# Patient Record
Sex: FEMALE | Race: WHITE | NOT HISPANIC OR LATINO | Employment: OTHER | URBAN - METROPOLITAN AREA
[De-identification: names, ages, dates, MRNs, and addresses within clinical notes are randomized per-mention and may not be internally consistent; named-entity substitution may affect disease eponyms.]

---

## 2017-05-04 ENCOUNTER — HOSPITAL ENCOUNTER (OUTPATIENT)
Dept: GENERAL RADIOLOGY | Facility: HOSPITAL | Age: 77
Discharge: HOME | End: 2017-05-04
Attending: INTERNAL MEDICINE | Admitting: INTERNAL MEDICINE

## 2017-05-04 ENCOUNTER — OFFICE VISIT (OUTPATIENT)
Dept: CARDIOLOGY | Facility: CLINIC | Age: 77
End: 2017-05-04

## 2017-05-04 VITALS
DIASTOLIC BLOOD PRESSURE: 78 MMHG | HEIGHT: 62 IN | BODY MASS INDEX: 31.1 KG/M2 | SYSTOLIC BLOOD PRESSURE: 124 MMHG | HEART RATE: 56 BPM | WEIGHT: 169 LBS

## 2017-05-04 DIAGNOSIS — R05.9 COUGH: ICD-10-CM

## 2017-05-04 DIAGNOSIS — R06.02 SHORTNESS OF BREATH: ICD-10-CM

## 2017-05-04 DIAGNOSIS — R06.2 WHEEZING: ICD-10-CM

## 2017-05-04 DIAGNOSIS — I25.10 CORONARY ARTERY DISEASE INVOLVING NATIVE CORONARY ARTERY OF NATIVE HEART WITHOUT ANGINA PECTORIS: ICD-10-CM

## 2017-05-04 DIAGNOSIS — E11.59 TYPE 2 DIABETES MELLITUS WITH OTHER CIRCULATORY COMPLICATION: ICD-10-CM

## 2017-05-04 DIAGNOSIS — R05.9 COUGH: Primary | ICD-10-CM

## 2017-05-04 PROCEDURE — 99214 OFFICE O/P EST MOD 30 MIN: CPT | Performed by: INTERNAL MEDICINE

## 2017-05-04 PROCEDURE — 71020 HC CHEST PA AND LATERAL: CPT

## 2017-05-04 RX ORDER — ROSUVASTATIN CALCIUM 20 MG/1
5 TABLET, COATED ORAL DAILY
COMMUNITY
Start: 2016-05-31

## 2017-05-04 RX ORDER — METHYLPREDNISOLONE 4 MG/1
TABLET ORAL
Qty: 21 TABLET | Refills: 0 | Status: SHIPPED | OUTPATIENT
Start: 2017-05-04 | End: 2017-11-10 | Stop reason: HOSPADM

## 2017-05-04 RX ORDER — ALBUTEROL SULFATE 90 UG/1
2 AEROSOL, METERED RESPIRATORY (INHALATION) EVERY 4 HOURS PRN
Refills: 0 | COMMUNITY
Start: 2017-03-01

## 2017-05-04 RX ORDER — ZOLPIDEM TARTRATE 10 MG/1
10 TABLET ORAL DAILY
COMMUNITY
Start: 2017-04-21

## 2017-11-07 ENCOUNTER — HOSPITAL ENCOUNTER (INPATIENT)
Facility: HOSPITAL | Age: 77
LOS: 3 days | Discharge: HOME | End: 2017-11-10
Attending: EMERGENCY MEDICINE | Admitting: FAMILY MEDICINE

## 2017-11-07 ENCOUNTER — APPOINTMENT (OUTPATIENT)
Dept: GENERAL RADIOLOGY | Facility: HOSPITAL | Age: 77
End: 2017-11-07

## 2017-11-07 DIAGNOSIS — J18.9 PNEUMONIA OF RIGHT LOWER LOBE DUE TO INFECTIOUS ORGANISM: Primary | ICD-10-CM

## 2017-11-07 DIAGNOSIS — R09.02 HYPOXIA: ICD-10-CM

## 2017-11-07 LAB
ALBUMIN SERPL-MCNC: 3.8 G/DL (ref 3.5–5.2)
ALBUMIN/GLOB SERPL: 1.4 G/DL
ALP SERPL-CCNC: 68 U/L (ref 40–129)
ALT SERPL W P-5'-P-CCNC: 20 U/L (ref 5–33)
ANION GAP SERPL CALCULATED.3IONS-SCNC: 13.1 MMOL/L
AST SERPL-CCNC: 18 U/L (ref 5–32)
B PERT DNA SPEC QL NAA+PROBE: NOT DETECTED
BASOPHILS # BLD AUTO: 0.02 10*3/MM3 (ref 0–0.2)
BASOPHILS NFR BLD AUTO: 0.5 % (ref 0–2)
BILIRUB SERPL-MCNC: 0.3 MG/DL (ref 0.2–1.2)
BUN BLD-MCNC: 9 MG/DL (ref 8–23)
BUN/CREAT SERPL: 12 (ref 7–25)
C PNEUM DNA NPH QL NAA+NON-PROBE: NOT DETECTED
CALCIUM SPEC-SCNC: 8.6 MG/DL (ref 8.8–10.5)
CHLORIDE SERPL-SCNC: 102 MMOL/L (ref 98–107)
CO2 SERPL-SCNC: 25.9 MMOL/L (ref 22–29)
CREAT BLD-MCNC: 0.75 MG/DL (ref 0.57–1)
D-LACTATE SERPL-SCNC: 2.3 MMOL/L (ref 0.5–2)
D-LACTATE SERPL-SCNC: 2.5 MMOL/L (ref 0.5–2)
DEPRECATED RDW RBC AUTO: 43.9 FL (ref 37–54)
EOSINOPHIL # BLD AUTO: 0.12 10*3/MM3 (ref 0.1–0.3)
EOSINOPHIL NFR BLD AUTO: 3 % (ref 0–4)
ERYTHROCYTE [DISTWIDTH] IN BLOOD BY AUTOMATED COUNT: 12.5 % (ref 11.5–14.5)
FLUAV AG NPH QL: NEGATIVE
FLUAV H1 2009 PAND RNA NPH QL NAA+PROBE: NOT DETECTED
FLUAV H1 HA GENE NPH QL NAA+PROBE: NOT DETECTED
FLUAV H3 RNA NPH QL NAA+PROBE: NOT DETECTED
FLUAV SUBTYP SPEC NAA+PROBE: NOT DETECTED
FLUBV AG NPH QL IA: NEGATIVE
FLUBV RNA ISLT QL NAA+PROBE: NOT DETECTED
GFR SERPL CREATININE-BSD FRML MDRD: 75 ML/MIN/1.73
GLOBULIN UR ELPH-MCNC: 2.8 GM/DL
GLUCOSE BLD-MCNC: 140 MG/DL (ref 65–99)
HADV DNA SPEC NAA+PROBE: NOT DETECTED
HCOV 229E RNA SPEC QL NAA+PROBE: NOT DETECTED
HCOV HKU1 RNA SPEC QL NAA+PROBE: NOT DETECTED
HCOV NL63 RNA SPEC QL NAA+PROBE: NOT DETECTED
HCOV OC43 RNA SPEC QL NAA+PROBE: NOT DETECTED
HCT VFR BLD AUTO: 34.9 % (ref 37–47)
HGB BLD-MCNC: 11.7 G/DL (ref 12–16)
HMPV RNA NPH QL NAA+NON-PROBE: NOT DETECTED
HOLD SPECIMEN: NORMAL
HPIV1 RNA SPEC QL NAA+PROBE: NOT DETECTED
HPIV2 RNA SPEC QL NAA+PROBE: NOT DETECTED
HPIV3 RNA NPH QL NAA+PROBE: NOT DETECTED
HPIV4 P GENE NPH QL NAA+PROBE: DETECTED
IMM GRANULOCYTES # BLD: 0.01 10*3/MM3 (ref 0–0.03)
IMM GRANULOCYTES NFR BLD: 0.3 % (ref 0–0.5)
LYMPHOCYTES # BLD AUTO: 0.35 10*3/MM3 (ref 0.6–4.8)
LYMPHOCYTES NFR BLD AUTO: 8.8 % (ref 20–45)
M PNEUMO IGG SER IA-ACNC: NOT DETECTED
MCH RBC QN AUTO: 32.3 PG (ref 27–31)
MCHC RBC AUTO-ENTMCNC: 33.5 G/DL (ref 31–37)
MCV RBC AUTO: 96.4 FL (ref 81–99)
MONOCYTES # BLD AUTO: 0.6 10*3/MM3 (ref 0–1)
MONOCYTES NFR BLD AUTO: 15 % (ref 3–8)
NEUTROPHILS # BLD AUTO: 2.9 10*3/MM3 (ref 1.5–8.3)
NEUTROPHILS NFR BLD AUTO: 72.4 % (ref 45–70)
NRBC BLD MANUAL-RTO: 0 /100 WBC (ref 0–0)
NT-PROBNP SERPL-MCNC: 1446 PG/ML (ref 5–450)
PLATELET # BLD AUTO: 118 10*3/MM3 (ref 140–500)
PMV BLD AUTO: 11.2 FL (ref 7.4–10.4)
POTASSIUM BLD-SCNC: 3.6 MMOL/L (ref 3.5–5.2)
PROCALCITONIN SERPL-MCNC: 0.04 NG/ML (ref 0.1–0.25)
PROT SERPL-MCNC: 6.6 G/DL (ref 6–8.5)
RBC # BLD AUTO: 3.62 10*6/MM3 (ref 4.2–5.4)
RHINOVIRUS RNA SPEC NAA+PROBE: NOT DETECTED
RSV RNA NPH QL NAA+NON-PROBE: NOT DETECTED
SODIUM BLD-SCNC: 141 MMOL/L (ref 136–145)
TROPONIN T SERPL-MCNC: <0.01 NG/ML (ref 0–0.03)
TROPONIN T SERPL-MCNC: <0.01 NG/ML (ref 0–0.03)
WBC NRBC COR # BLD: 4 10*3/MM3 (ref 4.8–10.8)
WHOLE BLOOD HOLD SPECIMEN: NORMAL
WHOLE BLOOD HOLD SPECIMEN: NORMAL

## 2017-11-07 PROCEDURE — 25010000002 METHYLPREDNISOLONE PER 125 MG: Performed by: INTERNAL MEDICINE

## 2017-11-07 PROCEDURE — 25010000002 AZITHROMYCIN: Performed by: EMERGENCY MEDICINE

## 2017-11-07 PROCEDURE — 83880 ASSAY OF NATRIURETIC PEPTIDE: CPT | Performed by: EMERGENCY MEDICINE

## 2017-11-07 PROCEDURE — 94799 UNLISTED PULMONARY SVC/PX: CPT

## 2017-11-07 PROCEDURE — 87804 INFLUENZA ASSAY W/OPTIC: CPT | Performed by: EMERGENCY MEDICINE

## 2017-11-07 PROCEDURE — 84145 PROCALCITONIN (PCT): CPT | Performed by: EMERGENCY MEDICINE

## 2017-11-07 PROCEDURE — 83605 ASSAY OF LACTIC ACID: CPT | Performed by: EMERGENCY MEDICINE

## 2017-11-07 PROCEDURE — 87581 M.PNEUMON DNA AMP PROBE: CPT | Performed by: EMERGENCY MEDICINE

## 2017-11-07 PROCEDURE — 25010000002 CEFTRIAXONE: Performed by: EMERGENCY MEDICINE

## 2017-11-07 PROCEDURE — 84484 ASSAY OF TROPONIN QUANT: CPT | Performed by: EMERGENCY MEDICINE

## 2017-11-07 PROCEDURE — 87486 CHLMYD PNEUM DNA AMP PROBE: CPT | Performed by: EMERGENCY MEDICINE

## 2017-11-07 PROCEDURE — 99222 1ST HOSP IP/OBS MODERATE 55: CPT | Performed by: INTERNAL MEDICINE

## 2017-11-07 PROCEDURE — 93010 ELECTROCARDIOGRAM REPORT: CPT | Performed by: INTERNAL MEDICINE

## 2017-11-07 PROCEDURE — 25010000002 METHYLPREDNISOLONE PER 40 MG: Performed by: EMERGENCY MEDICINE

## 2017-11-07 PROCEDURE — 25010000002 ENOXAPARIN PER 10 MG: Performed by: INTERNAL MEDICINE

## 2017-11-07 PROCEDURE — 85025 COMPLETE CBC W/AUTO DIFF WBC: CPT | Performed by: EMERGENCY MEDICINE

## 2017-11-07 PROCEDURE — 71020 HC CHEST PA AND LATERAL: CPT

## 2017-11-07 PROCEDURE — 99291 CRITICAL CARE FIRST HOUR: CPT | Performed by: EMERGENCY MEDICINE

## 2017-11-07 PROCEDURE — 94640 AIRWAY INHALATION TREATMENT: CPT

## 2017-11-07 PROCEDURE — 87798 DETECT AGENT NOS DNA AMP: CPT | Performed by: EMERGENCY MEDICINE

## 2017-11-07 PROCEDURE — 84484 ASSAY OF TROPONIN QUANT: CPT | Performed by: INTERNAL MEDICINE

## 2017-11-07 PROCEDURE — 80053 COMPREHEN METABOLIC PANEL: CPT | Performed by: EMERGENCY MEDICINE

## 2017-11-07 PROCEDURE — 99285 EMERGENCY DEPT VISIT HI MDM: CPT

## 2017-11-07 PROCEDURE — 87040 BLOOD CULTURE FOR BACTERIA: CPT | Performed by: EMERGENCY MEDICINE

## 2017-11-07 PROCEDURE — 82803 BLOOD GASES ANY COMBINATION: CPT | Performed by: EMERGENCY MEDICINE

## 2017-11-07 PROCEDURE — 93005 ELECTROCARDIOGRAM TRACING: CPT | Performed by: EMERGENCY MEDICINE

## 2017-11-07 PROCEDURE — 36600 WITHDRAWAL OF ARTERIAL BLOOD: CPT | Performed by: EMERGENCY MEDICINE

## 2017-11-07 PROCEDURE — 87633 RESP VIRUS 12-25 TARGETS: CPT | Performed by: EMERGENCY MEDICINE

## 2017-11-07 RX ORDER — IPRATROPIUM BROMIDE AND ALBUTEROL SULFATE 2.5; .5 MG/3ML; MG/3ML
3 SOLUTION RESPIRATORY (INHALATION) ONCE
Status: COMPLETED | OUTPATIENT
Start: 2017-11-07 | End: 2017-11-07

## 2017-11-07 RX ORDER — METHYLPREDNISOLONE SODIUM SUCCINATE 125 MG/2ML
60 INJECTION, POWDER, LYOPHILIZED, FOR SOLUTION INTRAMUSCULAR; INTRAVENOUS EVERY 6 HOURS
Status: DISCONTINUED | OUTPATIENT
Start: 2017-11-07 | End: 2017-11-08

## 2017-11-07 RX ORDER — TRAZODONE HYDROCHLORIDE 50 MG/1
25 TABLET ORAL NIGHTLY
Status: DISCONTINUED | OUTPATIENT
Start: 2017-11-07 | End: 2017-11-07

## 2017-11-07 RX ORDER — IPRATROPIUM BROMIDE AND ALBUTEROL SULFATE 2.5; .5 MG/3ML; MG/3ML
3 SOLUTION RESPIRATORY (INHALATION) EVERY 4 HOURS PRN
Status: DISCONTINUED | OUTPATIENT
Start: 2017-11-07 | End: 2017-11-10 | Stop reason: HOSPADM

## 2017-11-07 RX ORDER — FLUOXETINE 10 MG/1
10 CAPSULE ORAL DAILY
Status: DISCONTINUED | OUTPATIENT
Start: 2017-11-07 | End: 2017-11-10 | Stop reason: HOSPADM

## 2017-11-07 RX ORDER — IPRATROPIUM BROMIDE AND ALBUTEROL SULFATE 2.5; .5 MG/3ML; MG/3ML
3 SOLUTION RESPIRATORY (INHALATION)
Status: DISCONTINUED | OUTPATIENT
Start: 2017-11-07 | End: 2017-11-10 | Stop reason: HOSPADM

## 2017-11-07 RX ORDER — SODIUM CHLORIDE 9 MG/ML
40 INJECTION, SOLUTION INTRAVENOUS AS NEEDED
Status: DISCONTINUED | OUTPATIENT
Start: 2017-11-07 | End: 2017-11-10 | Stop reason: HOSPADM

## 2017-11-07 RX ORDER — ACETAMINOPHEN 325 MG/1
650 TABLET ORAL EVERY 6 HOURS PRN
Status: DISCONTINUED | OUTPATIENT
Start: 2017-11-07 | End: 2017-11-10 | Stop reason: HOSPADM

## 2017-11-07 RX ORDER — ASPIRIN 81 MG/1
81 TABLET, CHEWABLE ORAL DAILY
Status: DISCONTINUED | OUTPATIENT
Start: 2017-11-07 | End: 2017-11-10 | Stop reason: HOSPADM

## 2017-11-07 RX ORDER — LISINOPRIL 20 MG/1
20 TABLET ORAL
Status: DISCONTINUED | OUTPATIENT
Start: 2017-11-07 | End: 2017-11-10 | Stop reason: HOSPADM

## 2017-11-07 RX ORDER — SODIUM CHLORIDE 450 MG/100ML
100 INJECTION, SOLUTION INTRAVENOUS CONTINUOUS
Status: DISCONTINUED | OUTPATIENT
Start: 2017-11-07 | End: 2017-11-08

## 2017-11-07 RX ORDER — SODIUM CHLORIDE 0.9 % (FLUSH) 0.9 %
1-10 SYRINGE (ML) INJECTION AS NEEDED
Status: DISCONTINUED | OUTPATIENT
Start: 2017-11-07 | End: 2017-11-10 | Stop reason: HOSPADM

## 2017-11-07 RX ORDER — DOCUSATE SODIUM 100 MG/1
100 CAPSULE, LIQUID FILLED ORAL 2 TIMES DAILY
Status: DISCONTINUED | OUTPATIENT
Start: 2017-11-07 | End: 2017-11-10 | Stop reason: HOSPADM

## 2017-11-07 RX ORDER — TRAZODONE HYDROCHLORIDE 50 MG/1
50 TABLET ORAL NIGHTLY
Status: DISCONTINUED | OUTPATIENT
Start: 2017-11-08 | End: 2017-11-10 | Stop reason: HOSPADM

## 2017-11-07 RX ORDER — SODIUM CHLORIDE 0.9 % (FLUSH) 0.9 %
10 SYRINGE (ML) INJECTION AS NEEDED
Status: DISCONTINUED | OUTPATIENT
Start: 2017-11-07 | End: 2017-11-10 | Stop reason: HOSPADM

## 2017-11-07 RX ORDER — FLUOXETINE 10 MG/1
10 CAPSULE ORAL DAILY
COMMUNITY

## 2017-11-07 RX ORDER — PANTOPRAZOLE SODIUM 40 MG/1
40 TABLET, DELAYED RELEASE ORAL
Status: DISCONTINUED | OUTPATIENT
Start: 2017-11-08 | End: 2017-11-10 | Stop reason: HOSPADM

## 2017-11-07 RX ORDER — OMEGA-3S/DHA/EPA/FISH OIL/D3 300MG-1000
50000 CAPSULE ORAL
Status: DISCONTINUED | OUTPATIENT
Start: 2017-11-08 | End: 2017-11-07 | Stop reason: CLARIF

## 2017-11-07 RX ORDER — METHYLPREDNISOLONE SODIUM SUCCINATE 40 MG/ML
80 INJECTION, POWDER, LYOPHILIZED, FOR SOLUTION INTRAMUSCULAR; INTRAVENOUS ONCE
Status: COMPLETED | OUTPATIENT
Start: 2017-11-07 | End: 2017-11-07

## 2017-11-07 RX ORDER — ROSUVASTATIN CALCIUM 5 MG/1
5 TABLET, COATED ORAL DAILY
Status: DISCONTINUED | OUTPATIENT
Start: 2017-11-07 | End: 2017-11-10 | Stop reason: HOSPADM

## 2017-11-07 RX ORDER — ATENOLOL 50 MG/1
50 TABLET ORAL DAILY
Status: DISCONTINUED | OUTPATIENT
Start: 2017-11-07 | End: 2017-11-10 | Stop reason: HOSPADM

## 2017-11-07 RX ORDER — TRIAMTERENE AND HYDROCHLOROTHIAZIDE 37.5; 25 MG/1; MG/1
1 TABLET ORAL DAILY
Status: DISCONTINUED | OUTPATIENT
Start: 2017-11-07 | End: 2017-11-10 | Stop reason: HOSPADM

## 2017-11-07 RX ORDER — DULOXETIN HYDROCHLORIDE 30 MG/1
30 CAPSULE, DELAYED RELEASE ORAL DAILY
Status: DISCONTINUED | OUTPATIENT
Start: 2017-11-07 | End: 2017-11-10 | Stop reason: HOSPADM

## 2017-11-07 RX ADMIN — FLUOXETINE 10 MG: 10 CAPSULE ORAL at 17:43

## 2017-11-07 RX ADMIN — METHYLPREDNISOLONE SODIUM SUCCINATE 60 MG: 125 INJECTION, POWDER, FOR SOLUTION INTRAMUSCULAR; INTRAVENOUS at 22:09

## 2017-11-07 RX ADMIN — SODIUM CHLORIDE 1000 ML: 9 INJECTION, SOLUTION INTRAVENOUS at 13:30

## 2017-11-07 RX ADMIN — METHYLPREDNISOLONE SODIUM SUCCINATE 80 MG: 40 INJECTION, POWDER, FOR SOLUTION INTRAMUSCULAR; INTRAVENOUS at 12:23

## 2017-11-07 RX ADMIN — ROSUVASTATIN CALCIUM 5 MG: 5 TABLET, FILM COATED ORAL at 17:43

## 2017-11-07 RX ADMIN — METHYLPREDNISOLONE SODIUM SUCCINATE 60 MG: 125 INJECTION, POWDER, FOR SOLUTION INTRAMUSCULAR; INTRAVENOUS at 17:44

## 2017-11-07 RX ADMIN — TRAZODONE HYDROCHLORIDE 50 MG: 50 TABLET ORAL at 20:29

## 2017-11-07 RX ADMIN — IPRATROPIUM BROMIDE AND ALBUTEROL SULFATE 3 ML: .5; 3 SOLUTION RESPIRATORY (INHALATION) at 13:00

## 2017-11-07 RX ADMIN — ENOXAPARIN SODIUM 40 MG: 40 INJECTION SUBCUTANEOUS at 17:43

## 2017-11-07 RX ADMIN — AZITHROMYCIN 500 MG: 500 INJECTION, POWDER, LYOPHILIZED, FOR SOLUTION INTRAVENOUS at 13:31

## 2017-11-07 RX ADMIN — SODIUM CHLORIDE 100 ML/HR: 4.5 INJECTION, SOLUTION INTRAVENOUS at 17:39

## 2017-11-07 RX ADMIN — DOCUSATE SODIUM 100 MG: 100 CAPSULE, LIQUID FILLED ORAL at 17:43

## 2017-11-07 RX ADMIN — LISINOPRIL 20 MG: 20 TABLET ORAL at 17:43

## 2017-11-07 RX ADMIN — TRIAMTERENE AND HYDROCHLOROTHIAZIDE 1 TABLET: 37.5; 25 TABLET ORAL at 17:43

## 2017-11-07 RX ADMIN — CEFTRIAXONE 2 G: 2 INJECTION, POWDER, FOR SOLUTION INTRAMUSCULAR; INTRAVENOUS at 12:33

## 2017-11-07 RX ADMIN — ATENOLOL 50 MG: 50 TABLET ORAL at 17:43

## 2017-11-07 RX ADMIN — IPRATROPIUM BROMIDE AND ALBUTEROL SULFATE 3 ML: .5; 3 SOLUTION RESPIRATORY (INHALATION) at 17:53

## 2017-11-07 RX ADMIN — ASPIRIN 81 MG 81 MG: 81 TABLET ORAL at 17:43

## 2017-11-07 RX ADMIN — DULOXETINE HYDROCHLORIDE 30 MG: 30 CAPSULE, DELAYED RELEASE ORAL at 17:43

## 2017-11-07 RX ADMIN — Medication 10 ML: at 11:00

## 2017-11-07 NOTE — ED PROVIDER NOTES
Subjective   History of Present Illness  History of Present Illness    Chief complaint: Cough and shortness of breath    Location: Not applicable    Quality/Severity:  Severe    Timing/Duration: Symptoms began yesterday    Modifying Factors: Symptoms started out as upper respiratory and then progressed into pulmonary    Associated Symptoms: Fatigue and malaise    Narrative: The patient is a 77-year-old white female who presents as noted above.  The patient does not have any significant respiratory history but has previously been prescribed a multidose inhaler.  Symptoms began yesterday as sneezing and coryza and then rapidly progressed to a loose and deep cough.  Cough is productive of some white sputum.  Patient began to have severe shortness of breath through the night.    Review of Systems   Constitutional: Positive for fatigue. Negative for activity change, appetite change and fever.   HENT: Positive for congestion and rhinorrhea.    Eyes: Negative for discharge and redness.   Respiratory: Positive for cough, chest tightness, shortness of breath and wheezing.    Cardiovascular: Positive for leg swelling (mild). Negative for chest pain and palpitations.   Gastrointestinal: Negative for abdominal pain, diarrhea, nausea and vomiting.   Endocrine: Negative for polydipsia.   Genitourinary: Negative for difficulty urinating, dysuria, flank pain, frequency and urgency.   Musculoskeletal: Negative for back pain.   Skin: Negative for rash.   Neurological: Positive for weakness. Negative for dizziness and headaches.   Psychiatric/Behavioral: Negative for confusion.       Past Medical History:   Diagnosis Date   • Adverse reaction to metoprolol     Nightmares, vivid dreams, and sleep disturbance with metoprolol in the past   • CAD (coronary artery disease)     Cath 3/4/10: 30-40% spasm of ostial RCA.  PDA with 30% mid lesion.  Left coronaries normal.   • Chest pain     History of recurrent non-cardiac chest pain   •  Diabetes mellitus    • Hyperlipidemia    • Hypertension        No Known Allergies    Past Surgical History:   Procedure Laterality Date   • BILATERAL BREAST REDUCTION     • TOTAL ABDOMINAL HYSTERECTOMY WITH SALPINGO OOPHORECTOMY         Family History   Problem Relation Age of Onset   • Heart disease Mother      pacemaker   • Hypertension Mother    • Heart attack Maternal Grandmother    • Heart attack Maternal Grandfather    • Hypertension Father    • Other Sister      small facial stroke       Social History     Social History   • Marital status: Single     Spouse name: N/A   • Number of children: N/A   • Years of education: N/A     Social History Main Topics   • Smoking status: Former Smoker   • Smokeless tobacco: Never Used   • Alcohol use No      Comment: Daily caffeine use   • Drug use: No   • Sexual activity: Defer     Other Topics Concern   • None     Social History Narrative   • None           Objective   Physical Exam   Constitutional: She is oriented to person, place, and time.   The patient is a 77-year-old white female who appears younger than her stated age.  The patient does have some tachypnea and an occasional deep, loose cough is noted.   HENT:   Head: Normocephalic and atraumatic.   Eyes: Conjunctivae and EOM are normal.   Neck: Normal range of motion. Neck supple. No thyromegaly present.   Cardiovascular: Normal rate, regular rhythm and normal heart sounds.    No murmur heard.  Pulmonary/Chest:   Tachypnea present although the patient is able to speak normally.  Auscultation of the lungs does reveal coarse, wet wheezes in all lung fields with decreased exchange bilaterally.  By basilar rales present.   Abdominal: Soft. Bowel sounds are normal. She exhibits no distension. There is no tenderness.   Musculoskeletal: Normal range of motion. She exhibits edema (trace bilaterally). She exhibits no tenderness.   Lymphadenopathy:     She has no cervical adenopathy.   Neurological: She is alert and  oriented to person, place, and time.   Skin: Skin is warm and dry. No rash noted.   Psychiatric: She has a normal mood and affect. Her behavior is normal. Thought content normal.   Nursing note and vitals reviewed.      Procedures  Xr Chest 2 View    Result Date: 11/7/2017  Narrative: CHEST X-RAY, 11/07/2017:  HISTORY: 77-year-old female in the ED complaining of one history of shortness of air, wheezing and productive cough.  TECHNIQUE: PA and lateral upright chest series.  FINDINGS: Mild infiltrate is present in the posterior right lung base. Remaining portions of both lungs are clear. No dense airspace consolidation or visible pleural effusion. Heart size and pulmonary vascularity are within normal limits.      Impression: Right lower lobe infiltrate.  This report was finalized on 11/7/2017 11:24 AM by Dr. Mick Jensen MD.           ED Course  ED Course   Comment By Time   11/07/17, 1:07 PM  Lactic acid elevated and all findings discussed with patient.  Patient is agreeable to admission and the hospitalist has been paged.  Antibiotics previously started Ian Carcamo MD 11/07 4618   Case and findings discussed with  who agreed that the patient's condition warranted admission to the hospital for further treatment of her right lower lobe pneumonia.  Arterial blood gas results were reviewed. Ian Carcamo MD 11/07 1214                  MDM  Number of Diagnoses or Management Options  Hypoxia: new and requires workup  Pneumonia of right lower lobe due to infectious organism: new and requires workup     Amount and/or Complexity of Data Reviewed  Clinical lab tests: ordered and reviewed  Tests in the radiology section of CPT®: ordered and reviewed  Discuss the patient with other providers: yes (Nilsa)  Independent visualization of images, tracings, or specimens: yes    Risk of Complications, Morbidity, and/or Mortality  Presenting problems: high  Diagnostic procedures: high  Management options:  high    Critical Care  Total time providing critical care: 30-74 minutes  My differential diagnosis for dyspnea includes but is not limited to:  Asthma, COPD, pneumonia, pulmonary embolus, acute respiratory distress syndrome, pneumothorax, pleural effusion, pulmonary fibrosis, congestive heart failure, myocardial infarction, DKA, uremia, acidosis, sepsis, anemia, drug related, hyperventilation, CNS disease    Labs this visit  Lab Results (last 24 hours)     Procedure Component Value Units Date/Time    CBC & Differential [[id number]] Collected:  11/07/17 1052    Specimen:  Blood Updated:  11/07/17 1059    Narrative:       The following orders were created for panel order CBC & Differential.  Procedure                               Abnormality         Status                     ---------                               -----------         ------                     CBC Auto Differential[[id number]]        Abnormal            Final result                 Please view results for these tests on the individual orders.    Comprehensive Metabolic Panel [[id number]]  (Abnormal) Collected:  11/07/17 1052    Specimen:  Blood from Arm, Left Updated:  11/07/17 1118     Glucose 140 (H) mg/dL      BUN 9 mg/dL      Creatinine 0.75 mg/dL      Sodium 141 mmol/L      Potassium 3.6 mmol/L      Chloride 102 mmol/L      CO2 25.9 mmol/L      Calcium 8.6 (L) mg/dL      Total Protein 6.6 g/dL      Albumin 3.80 g/dL      ALT (SGPT) 20 U/L      AST (SGOT) 18 U/L      Alkaline Phosphatase 68 U/L      Total Bilirubin 0.3 mg/dL      eGFR Non African Amer 75 mL/min/1.73      Globulin 2.8 gm/dL      A/G Ratio 1.4 g/dL      BUN/Creatinine Ratio 12.0     Anion Gap 13.1 mmol/L     Narrative:       The MDRD GFR formula is only valid for adults with stable renal function between ages 18 and 70.    BNP [[id number]]  (Abnormal) Collected:  11/07/17 1052    Specimen:  Blood from Arm, Left Updated:  11/07/17 1127     proBNP 1446.0 (H) pg/mL     Narrative:        Among patients with dyspnea, NT-proBNP is highly sensitive for the detection of acute congestive heart failure. In addition NT-proBNP of <300 pg/ml effectively rules out acute congestive heart failure with 99% negative predictive value.    Troponin [[id number]]  (Normal) Collected:  11/07/17 1052    Specimen:  Blood from Arm, Left Updated:  11/07/17 1120     Troponin T <0.010 ng/mL     Narrative:       Troponin T Reference Ranges:  Less than 0.03 ng/mL:    Negative for AMI  0.03 to 0.09 ng/mL:      Indeterminant for AMI  Greater than 0.09 ng/mL: Positive for AMI    CBC Auto Differential [[id number]]  (Abnormal) Collected:  11/07/17 1052    Specimen:  Blood from Arm, Left Updated:  11/07/17 1059     WBC 4.00 (L) 10*3/mm3      RBC 3.62 (L) 10*6/mm3      Hemoglobin 11.7 (L) g/dL      Hematocrit 34.9 (L) %      MCV 96.4 fL      MCH 32.3 (H) pg      MCHC 33.5 g/dL      RDW 12.5 %      RDW-SD 43.9 fl      MPV 11.2 (H) fL      Platelets 118 (L) 10*3/mm3      Neutrophil % 72.4 (H) %      Lymphocyte % 8.8 (L) %      Monocyte % 15.0 (H) %      Eosinophil % 3.0 %      Basophil % 0.5 %      Immature Grans % 0.3 %      Neutrophils, Absolute 2.90 10*3/mm3      Lymphocytes, Absolute 0.35 (L) 10*3/mm3      Monocytes, Absolute 0.60 10*3/mm3      Eosinophils, Absolute 0.12 10*3/mm3      Basophils, Absolute 0.02 10*3/mm3      Immature Grans, Absolute 0.01 10*3/mm3      nRBC 0.0 /100 WBC     Lactic Acid, Plasma [[id number]]  (Abnormal) Collected:  11/07/17 1237    Specimen:  Blood Updated:  11/07/17 1302     Lactate 2.5 (C) mmol/L     Blood Culture - Blood, [[id number]] Collected:  11/07/17 1310    Specimen:  Blood from Arm, Left Updated:  11/07/17 1349    Blood Culture - Blood, [[id number]] Collected:  11/07/17 1310    Specimen:  Blood from Arm, Right Updated:  11/07/17 1350    Procalcitonin [[id number]] Collected:  11/07/17 1310    Specimen:  Blood Updated:  11/07/17 1310        Prescribed on discharge             Medication List       Notice     No changes were made to your prescriptions during this visit.        All lab results, imaging results and other tests were reviewed by Ian Carcamo MD and unless otherwise specified were found to be unremarkable.      Final diagnoses:   Pneumonia of right lower lobe due to infectious organism   Hypoxia            Ian Carcamo MD  11/07/17 3469

## 2017-11-07 NOTE — H&P
Cj Ash MD Physician Signed Medicine Progress Notes Date of Service: 11/7/2017  4:03 PM      Expand All Collapse All    []Hide copied text                                                                             HOSPITALIST SERVICES                                                                    @ [facility], [place]          [facility] Hospitalist Team     ADMISSION HISTORY AND PHYSICAL     PATIENT:        Patient Care Team:  Sandra Foley MD as PCP - General  Sandra Foley MD     PATIENT IS A RESIDENT OF:  At home        PATIENT ACCOMPANIED BY:  Family present        CHIEF COMPLAINT:      Cough and shortness of breath since yesterday     HISTORY OF PRESENT ILLNESS:     As per Dr Carcamo's ED Note:  Narrative: The patient is a 77-year-old white female who presents as noted above.  The patient does not have any significant respiratory history but has previously been prescribed a multidose inhaler.  Symptoms began yesterday as sneezing and coryza and then rapidly progressed to a loose and deep cough.  Cough is productive of some white sputum.  Patient began to have severe shortness of breath through the night.        Patient denies any sx of fever, headache, chest pain, shortness of abdominal pain, nausea/ vomiting, dysuria, urgency/ frequency or any recent hx of blood in stool. No other medical history available.               Medical History         Past Medical History:   Diagnosis Date   • Adverse reaction to metoprolol       Nightmares, vivid dreams, and sleep disturbance with metoprolol in the past   • CAD (coronary artery disease)       Cath 3/4/10: 30-40% spasm of ostial RCA.  PDA with 30% mid lesion.  Left coronaries normal.   • Chest pain       History of recurrent non-cardiac chest pain   • Diabetes mellitus     • Hyperlipidemia     • Hypertension            Surgical History          Past Surgical History:   Procedure Laterality Date   • BILATERAL BREAST REDUCTION        • TOTAL ABDOMINAL HYSTERECTOMY WITH SALPINGO OOPHORECTOMY                    Family History   Problem Relation Age of Onset   • Heart disease Mother         pacemaker   • Hypertension Mother     • Heart attack Maternal Grandmother     • Heart attack Maternal Grandfather     • Hypertension Father     • Other Sister         small facial stroke         Family History as documented in the Problem List.            Social History   Substance Use Topics   • Smoking status: Former Smoker   • Smokeless tobacco: Never Used   • Alcohol use No         Comment: Daily caffeine use       Prescriptions Prior to Admission            Prescriptions Prior to Admission   Medication Sig Dispense Refill Last Dose   • aspirin 81 MG tablet Take 81 mg by mouth Daily.     11/6/2017 at Unknown time   • atenolol (TENORMIN) 50 MG tablet Take 50 mg by mouth Daily.     11/6/2017 at Unknown time   • FLUoxetine (PROzac) 10 MG capsule Take 10 mg by mouth Daily.     11/6/2017 at Unknown time   • metFORMIN (GLUCOPHAGE) 1000 MG tablet Take 1,000 mg by mouth Daily With Breakfast.     11/6/2017 at 2000   • Potassium 99 MG tablet Take  by mouth Daily.     11/6/2017 at 0800   • rosuvastatin (CRESTOR) 20 MG tablet Take 5 mg by mouth Daily.     Past Month at Unknown time   • spironolactone-hydrochlorothiazide (ALDACTAZIDE) 25-25 MG tablet Take  by mouth Daily.     Past Week at Unknown time   • traZODone (DESYREL) 50 MG tablet Take 25 mg by mouth Every Night.     11/6/2017 at 2000   • VENTOLIN  (90 BASE) MCG/ACT inhaler 2 puffs Every 4 (Four) Hours As Needed for Wheezing.   0 11/7/2017 at 1030   • DULoxetine (CYMBALTA) 30 MG capsule Take 30 mg by mouth Daily.     Taking   • MethylPREDNISolone (MEDROL, BAN,) 4 MG tablet Take as directed on package instructions. 21 tablet 0     • vitamin D (ERGOCALCIFEROL) 42850 UNITS capsule capsule Take 50,000 Units by mouth Every 7 (Seven) Days.     11/5/2017 at 0800   • zolpidem (AMBIEN) 10 MG tablet Take 10 mg by  "mouth Daily.     Taking         Allergies:  Review of patient's allergies indicates no known allergies.     REVIEW OF SYSTEMS:  Please see the above history of present illness for pertinent positives and negatives.  The remainder of the patient's systems have been reviewed and are negative.      ROS negative except current and past medical and surgical history as noted in the Problem List.        Vital Signs  Temp:  [98.4 °F (36.9 °C)-99 °F (37.2 °C)] 98.5 °F (36.9 °C)  Heart Rate:  [62-78] 72  Resp:  [20] 20  BP: (146-200)/(61-92) 187/72     Flowsheet Rows           First Filed Value     Admission Height   62\" (157.5 cm) Documented at 11/07/2017 1049     Admission Weight   179 lb 3.2 oz (81.3 kg) Documented at 11/07/2017 1049            Physical Exam:     PHYSICAL EXAMINATION:     VITAL SIGNS: As per Nurse's notes     GENERAL APPEARANCE: The patient is a well developed, well nourished, obese (BMI 32.1), , in no acute distress without complaints of shortness of breath, dyspnea or acute pain. Lips and nail beds are pink.     HEENT: Normocephalic, atraumatic. PERRL. The sclerae anicteric and conjunctivae pink and moist. Extraocular muscle movements intact. No rhinitis. Lips, teeth, and gums showed normal mucosa. The oral mucosa, hard and soft palate, tongue and posterior pharynx were normal.      NECK: Supple and symmetric. No masses. No thyromegaly. No tenderness. Trachea central. No carotid bruits. No evidence of JVD.     LYMPH NODES: No palpable lymphadenopathy.     SKIN: Warm, dry and intact. No rash or lesions or wounds or patechiae.     CHEST: Normal AP diameter and normal contour.      LUNGS: Accessory muscles of respiration are not active. Bibasilar rales and rhonchi.  Respiratory expansion equal bilaterally.      CARDIOVASCULAR: RRR. S1, S2 normal without murmur/gallop/rub. No S3, S4. The carotid pulses were normal and 2+ bilaterally without bruits. Peripheral pulses were 2+ and symmetric. Capillary " "refill less than 3 seconds.     ABDOMEN: Soft, non-tender, non-distended. No masses. No rebound/guarding. No hepatosplenomegaly. Normal bowel sounds.      RECTAL: Not indicated.      EXTREMITIES:  No evidence of cyanosis, clubbing, or edema. No rash or lesions. + pedal pulses. No ulcers or varicosities identified. Pulses are 2+ throughout. No evidence of Pallor, Pulselessness, Poikilothermia (\"coldness\"), Paralysis or Paresthesia. Moves all extremities well. Negative Homans sign bilaterally.      MUSCULOSKELETAL: Examination of cervical and lumbosacral spines unremarkable. ROM of extremities WNL. No evidence of redness, swelling, warmth or pain of the joints of the extremities. Muscle strength and tone normal.     PSYCHIATRY: Responds appropriately to questions. No evidence of acute anxiety, depression, panic attacks or hallucinations.     MENTAL STATUS EXAMINATION: Neatly dressed, conscious and alert. Speech normal in tone. Pleasant and cooperative. Orientation x3. Thought process, content and insight are normal. Memory without deficits.     NEUROLOGIC: Examination is grossly intact globally with no focal deficits. Cranial nerves II through XII are grossly intact. No motor weakness. No decrease in sensation. No facial asymmetry.          Results Review:                         I reviewed the patient's new clinical results.  Lab Results (most recent)     Procedure Component Value Units Date/Time             CBC & Differential [[id number]] Collected:  11/07/17 1052     Specimen:  Blood Updated:  11/07/17 1059     Narrative:        The following orders were created for panel order CBC & Differential.  Procedure                               Abnormality         Status                     ---------                               -----------         ------                     CBC Auto Differential[[id number]]        Abnormal            Final result                  Please view results for these tests on the individual " orders.     CBC Auto Differential [[id number]]  (Abnormal) Collected:  11/07/17 1052     Specimen:  Blood from Arm, Left Updated:  11/07/17 1059       WBC 4.00 (L) 10*3/mm3         RBC 3.62 (L) 10*6/mm3         Hemoglobin 11.7 (L) g/dL         Hematocrit 34.9 (L) %         MCV 96.4 fL         MCH 32.3 (H) pg         MCHC 33.5 g/dL         RDW 12.5 %         RDW-SD 43.9 fl         MPV 11.2 (H) fL         Platelets 118 (L) 10*3/mm3         Neutrophil % 72.4 (H) %         Lymphocyte % 8.8 (L) %         Monocyte % 15.0 (H) %         Eosinophil % 3.0 %         Basophil % 0.5 %         Immature Grans % 0.3 %         Neutrophils, Absolute 2.90 10*3/mm3         Lymphocytes, Absolute 0.35 (L) 10*3/mm3         Monocytes, Absolute 0.60 10*3/mm3         Eosinophils, Absolute 0.12 10*3/mm3         Basophils, Absolute 0.02 10*3/mm3         Immature Grans, Absolute 0.01 10*3/mm3         nRBC 0.0 /100 WBC       Comprehensive Metabolic Panel [[id number]]  (Abnormal) Collected:  11/07/17 1052     Specimen:  Blood from Arm, Left Updated:  11/07/17 1118       Glucose 140 (H) mg/dL         BUN 9 mg/dL         Creatinine 0.75 mg/dL         Sodium 141 mmol/L         Potassium 3.6 mmol/L         Chloride 102 mmol/L         CO2 25.9 mmol/L         Calcium 8.6 (L) mg/dL         Total Protein 6.6 g/dL         Albumin 3.80 g/dL         ALT (SGPT) 20 U/L         AST (SGOT) 18 U/L         Alkaline Phosphatase 68 U/L         Total Bilirubin 0.3 mg/dL         eGFR Non African Amer 75 mL/min/1.73         Globulin 2.8 gm/dL         A/G Ratio 1.4 g/dL         BUN/Creatinine Ratio 12.0       Anion Gap 13.1 mmol/L       Narrative:        The MDRD GFR formula is only valid for adults with stable renal function between ages 18 and 70.     Troponin [[id number]]  (Normal) Collected:  11/07/17 1052     Specimen:  Blood from Arm, Left Updated:  11/07/17 1120       Troponin T <0.010 ng/mL       Narrative:        Troponin T Reference Ranges:  Less than 0.03  ng/mL:    Negative for AMI  0.03 to 0.09 ng/mL:      Indeterminant for AMI  Greater than 0.09 ng/mL: Positive for AMI     BNP [[id number]]  (Abnormal) Collected:  11/07/17 1052     Specimen:  Blood from Arm, Left Updated:  11/07/17 1127       proBNP 1446.0 (H) pg/mL       Narrative:        Among patients with dyspnea, NT-proBNP is highly sensitive for the detection of acute congestive heart failure. In addition NT-proBNP of <300 pg/ml effectively rules out acute congestive heart failure with 99% negative predictive value.     Wilmer Draw [[id number]] Collected:  11/07/17 1052     Specimen:  Blood Updated:  11/07/17 1201     Narrative:        The following orders were created for panel order Wilmer Draw.  Procedure                               Abnormality         Status                     ---------                               -----------         ------                     Light Blue Top[[id number]]                                   Final result               Green Top (Gel)[[id number]]                                  Final result               Lavender Top[[id number]]                                     Final result               Gold Top - SST[[id number]]                                                                 Please view results for these tests on the individual orders.     Light Blue Top [[id number]] Collected:  11/07/17 1052     Specimen:  Blood from Arm, Left Updated:  11/07/17 1201       Extra Tube hold for add-on         Auto resulted         Green Top (Gel) [[id number]] Collected:  11/07/17 1052     Specimen:  Blood from Arm, Left Updated:  11/07/17 1201       Extra Tube Hold for add-ons.         Auto resulted.         Lavender Top [[id number]] Collected:  11/07/17 1052     Specimen:  Blood from Arm, Left Updated:  11/07/17 1201       Extra Tube hold for add-on         Auto resulted         Lactic Acid, Plasma [[id number]]  (Abnormal) Collected:  11/07/17 1237     Specimen:  Blood Updated:  11/07/17 1302        Lactate 2.5 (C) mmol/L       Procalcitonin [[id number]] Collected:  11/07/17 1310     Specimen:  Blood Updated:  11/07/17 1310     Blood Culture - Blood, [[id number]] Collected:  11/07/17 1310     Specimen:  Blood from Arm, Left Updated:  11/07/17 1349     Blood Culture - Blood, [[id number]] Collected:  11/07/17 1310     Specimen:  Blood from Arm, Right Updated:  11/07/17 1350     Respiratory Panel, PCR - Swab, Nasopharynx [[id number]] Collected:  11/07/17 1436     Specimen:  Swab from Nasopharynx Updated:  11/07/17 1509     Influenza Antigen, Rapid - Swab, Nasopharynx [[id number]]  (Normal) Collected:  11/07/17 1436     Specimen:  Swab from Nasopharynx Updated:  11/07/17 1523       Influenza A Ag, EIA Negative       Influenza B Ag, EIA Negative            Imaging Results (most recent)     Procedure Component Value Units Date/Time     XR Chest 2 View [[id number]] Collected:  11/07/17 1123       Updated:  11/07/17 1127     Narrative:        CHEST X-RAY, 11/07/2017:      HISTORY:   77-year-old female in the ED complaining of one history of shortness of  air, wheezing and productive cough.      TECHNIQUE:  PA and lateral upright chest series.      FINDINGS:  Mild infiltrate is present in the posterior right lung base. Remaining  portions of both lungs are clear. No dense airspace consolidation or  visible pleural effusion. Heart size and pulmonary vascularity are  within normal limits.         Impression:        Right lower lobe infiltrate.      This report was finalized on 11/7/2017 11:24 AM by Dr. Mick Jensen MD.             reviewed     ECG/EMG Results (most recent)     Procedure Component Value Units Date/Time     ECG 12 Lead [[id number]] Collected:  11/07/17 1049       Updated:  11/07/17 1305         reviewed        Assessment/Plan            DIFFERENTIAL DIAGNOSES CONSIDERED FOR CHIEF COMPLAINT:          The following clinical entities were considered in differential diagnosis. The list includes commonly  encountered practical probabilities and rare esoteric diagnoses worked out through systemic diagnostic methods used to identify the presence of a disease entity where multiple diagnoses are possible. The decision is reached through either of the following methods: 1) direct confirmatory testing, or 2) a process of elimination, or 3) at least a process of obtaining information that shrinks the “probabilities” of candidate conditions to negligible levels, by using clinical evidence and thus implementing aspects of the hypothetico-deductive method.        DIFFERENTIAL DIAGNOSIS OF SHORTNESS OF BREATH     DIFFERENTIAL DIAGNOSIS OF SHORTNESS OF BREATH:  A) Cardiac: Congestive Heart Failure, Coronary Artery Disease, Myocardial Infarction (recent or past history), Cardiomyopathy, Valvular dysfunction, Left ventricular hypertrophy, Asymmetric septal hypertrophy, Pericarditis, and arrhythmias   B) Pulmonary: COPD, Asthma, Restrictive Lung Disease, Hereditary lung disorders, and Pneumothorax   C) Mixed cardiac or pulmonary: COPD with pulmonary hypertension and cor pulmonale, Deconditioning, Acute and chronic pulmonary emboli, and Trauma  D) Noncardiac or Nonpulmonary: Metabolic conditions (e.g. acidosis), Pain, Neuromuscular disorders, and Otorhinolaryngeal disorders  E) Functional: Anxiety, Panic disorders, and Hyperventilation              ASSESSMENT AND PLAN:         SUMMARY:         PROPHYLAXIS:   -Oxygen Saturation: As per Nurses' notes.   -DVT Prophylaxis: On Inj. Lovenox and SCDs  -Gastritis Prophylaxis: Pantoprazole 40 mg PO qAM   -Constipation Prophylaxis: colace 100 mg po BID   -Immunizations: N/A  -Intake and Output Orders: As per order sheet   -Kulkarni Catheter: Not indicated at this time  -Smoking/ Nicotine issues: N/A      ACTIVITIES:  -Bathroom Privileges: May use bathroom   -Activity: Encouraged to sit up in side chair for 2-4 hours BID   -PT/OT: N/A       NUTRITION AND FLUIDS:  -Diet/ Nutrition: Regular,  cardiac, consistent carbohydrate diet   -Fluid Status/Electrolytes: 1/2NS 1 L 100 mL/Hr       SOCIAL ISSUES:   -MRSA SCREEN: As per institutional protocol   -Behavioral/ Agitation Issues: NONE   -Social Issues: Lives at home with her family.   -Occupational Issues: Patient is Retire at this time.   -Code Status: FULL CODE on admission   -Disposition: Should be able to go back home following discharge      THERAPEUTIC:   ALLERGIES: NKDA   ANTIBIOTICS: Inj Ceftriaxone and Inj Zithromax   PAIN MANAGEMENT: N/A   ANTIPYRETIC: Tylenol 650 mg 1 po q4-6 hours for headache or   temp >100 degrees   INSULIN THERAPY: Low dose insulin coverage as ordered   CHEST PAIN: N/A    NEBULIZER TREATMENT: DuoNeb 3 ml via nebulizer q4-6 hrs PRN for shortness of breath and/or wheezing   ANXIETY: N/A    DEPRESSION: Cymbalta and Prozac    INSOMNIA: Trazodone 25 mg                      PLAN:     Labs and diagnostic tests reviewed: Trop <0.010, proBNP 1446.0, Gluc 140, Na 140, K 3.6, Creat 0.75, Venous Lactate 2.5, WBC 4.0, Hb 11.7, Plt 118, 72.4 N, 8.8 L     Diagnostic tests reviewed:        CXR  IMPRESSION:  Right lower lobe infiltrate.      This report was finalized on 11/7/2017 11:24 AM by Dr. Mick Jensen MD.     EKG 11/7/2017  NSR with nonspecific ST-T wave changes           Patient is clinically and hemodynamically stable     Will be seen by Pulmonary consultant in the morning     Any new recommendations: As per Pulmonary     New Labs ordered: Lactic acid at 8:00 PM. CBC, CMP and Lactic acid in AM     New diagnostic tests ordered: N/A     Any changes in medications: Will add Lisinopril     To continue current management and supportive care     Pain management issues: N/A     Discharge planning issues: Should be able to go home once ready for discharge     Will follow patient closely     Nothing new to add for right now           DIAGNOSES:        PRIMARY DIAGNOSES:        Acute hypoxic respiratory failure: O2 sat 58% in ED. Now 96%  on 3L O2 via nasal cannula.     RLL Pneumonia with sepsis: On Inj Ceftriaxone and Inj Zithromax. Venous lactate 2.5. WBC 4.00     HTN: Last /72. On Atenolol, Maxzide, and Lisinopril     HLD: On rosuvastatin.     Type II DM: Last blood sugar 146. On Metformin. On SSI.     Thrombocytopenia: Last platelet count 118. Watch.     CAD: Denies any chest pain. Cardiac cath done 0n 3/4/2010. 30-40% spasm of ostial RCA. PDA with 30% mid lesion. Left coronaries normal     Hx of recurrent non-cardiac chest pain: Hx noted. Not an acute issue at this time.     Depression: On Cymbalta and Prozac. Not an acute issue at this time.     Vit D deficiency: On replacement     Insomnia: On Trazodone. Not an acute issue.     Former smoker: Quit a few years ago     DVT Prophylaxis: Inj Lovenox and SCDs               SECONDARY DIAGNOSES:         As above           SURGICAL DIAGNOSES:        As per Problem List        I discussed the patients findings and my recommendations with patient and patient is agreeable to current treatment and management plan.      Cj Ash MD  11/07/17  4:03 PM              Cj Ash M.D., FACP  Internal Medicine/ Hospitalist           Time:                    Revision History       Date/Time User Provider Type Action     11/7/2017  4:54 PM Cj Ash MD Physician Sign     11/7/2017  4:54 PM Cj Ash MD Physician Share     View Details Report

## 2017-11-07 NOTE — ED NOTES
Pt reported shortness of breath after getting up to use bathroom.  RR 30, Expiratory wheezes noted without stethoscope.  Placed pt on 2L NC O2.  After resting and with O2 pt reported decreased shortness of breath.  Called RT r/t breathing treatment     Zuly Barakat RN  11/07/17 [id number]

## 2017-11-07 NOTE — PROGRESS NOTES
HOSPITALIST SERVICES     @ [facility], [place]        [facility] Hospitalist Team    ADMISSION HISTORY AND PHYSICAL    PATIENT:      Patient Care Team:  Sandra Foley MD as PCP - General  aSndra Foley MD    PATIENT IS A RESIDENT OF:  At home      PATIENT ACCOMPANIED BY:  Family present      CHIEF COMPLAINT:     Cough and shortness of breath since yesterday    HISTORY OF PRESENT ILLNESS:    As per Dr Carcamo's ED Note:  Narrative: The patient is a 77-year-old white female who presents as noted above.  The patient does not have any significant respiratory history but has previously been prescribed a multidose inhaler.  Symptoms began yesterday as sneezing and coryza and then rapidly progressed to a loose and deep cough.  Cough is productive of some white sputum.  Patient began to have severe shortness of breath through the night.      Patient denies any sx of fever, headache, chest pain, shortness of abdominal pain, nausea/ vomiting, dysuria, urgency/ frequency or any recent hx of blood in stool. No other medical history available.          Past Medical History:   Diagnosis Date   • Adverse reaction to metoprolol     Nightmares, vivid dreams, and sleep disturbance with metoprolol in the past   • CAD (coronary artery disease)     Cath 3/4/10: 30-40% spasm of ostial RCA.  PDA with 30% mid lesion.  Left coronaries normal.   • Chest pain     History of recurrent non-cardiac chest pain   • Diabetes mellitus    • Hyperlipidemia    • Hypertension      Past Surgical History:   Procedure Laterality Date   • BILATERAL BREAST REDUCTION     • TOTAL ABDOMINAL HYSTERECTOMY WITH SALPINGO OOPHORECTOMY       Family History   Problem Relation Age of Onset   • Heart disease Mother      pacemaker   • Hypertension Mother    • Heart attack Maternal Grandmother    • Heart attack Maternal Grandfather    • Hypertension Father    • Other Sister      small facial stroke       Family History as documented in  the Problem List.    Social History   Substance Use Topics   • Smoking status: Former Smoker   • Smokeless tobacco: Never Used   • Alcohol use No      Comment: Daily caffeine use     Prescriptions Prior to Admission   Medication Sig Dispense Refill Last Dose   • aspirin 81 MG tablet Take 81 mg by mouth Daily.   11/6/2017 at Unknown time   • atenolol (TENORMIN) 50 MG tablet Take 50 mg by mouth Daily.   11/6/2017 at Unknown time   • FLUoxetine (PROzac) 10 MG capsule Take 10 mg by mouth Daily.   11/6/2017 at Unknown time   • metFORMIN (GLUCOPHAGE) 1000 MG tablet Take 1,000 mg by mouth Daily With Breakfast.   11/6/2017 at 2000   • Potassium 99 MG tablet Take  by mouth Daily.   11/6/2017 at 0800   • rosuvastatin (CRESTOR) 20 MG tablet Take 5 mg by mouth Daily.   Past Month at Unknown time   • spironolactone-hydrochlorothiazide (ALDACTAZIDE) 25-25 MG tablet Take  by mouth Daily.   Past Week at Unknown time   • traZODone (DESYREL) 50 MG tablet Take 25 mg by mouth Every Night.   11/6/2017 at 2000   • VENTOLIN  (90 BASE) MCG/ACT inhaler 2 puffs Every 4 (Four) Hours As Needed for Wheezing.  0 11/7/2017 at 1030   • DULoxetine (CYMBALTA) 30 MG capsule Take 30 mg by mouth Daily.   Taking   • MethylPREDNISolone (MEDROL, BAN,) 4 MG tablet Take as directed on package instructions. 21 tablet 0    • vitamin D (ERGOCALCIFEROL) 84797 UNITS capsule capsule Take 50,000 Units by mouth Every 7 (Seven) Days.   11/5/2017 at 0800   • zolpidem (AMBIEN) 10 MG tablet Take 10 mg by mouth Daily.   Taking     Allergies:  Review of patient's allergies indicates no known allergies.    REVIEW OF SYSTEMS:  Please see the above history of present illness for pertinent positives and negatives.  The remainder of the patient's systems have been reviewed and are negative.     ROS negative except current and past medical and surgical history as noted in the Problem List.      Vital Signs  Temp:  [98.4 °F (36.9 °C)-99 °F (37.2 °C)] 98.5 °F (36.9  "°C)  Heart Rate:  [62-78] 72  Resp:  [20] 20  BP: (146-200)/(61-92) 187/72    Flowsheet Rows         First Filed Value    Admission Height  62\" (157.5 cm) Documented at 11/07/2017 1049    Admission Weight  179 lb 3.2 oz (81.3 kg) Documented at 11/07/2017 1049           Physical Exam:    PHYSICAL EXAMINATION:    VITAL SIGNS: As per Nurse's notes    GENERAL APPEARANCE: The patient is a well developed, well nourished, obese (BMI 32.1), , in no acute distress without complaints of shortness of breath, dyspnea or acute pain. Lips and nail beds are pink.    HEENT: Normocephalic, atraumatic. PERRL. The sclerae anicteric and conjunctivae pink and moist. Extraocular muscle movements intact. No rhinitis. Lips, teeth, and gums showed normal mucosa. The oral mucosa, hard and soft palate, tongue and posterior pharynx were normal.     NECK: Supple and symmetric. No masses. No thyromegaly. No tenderness. Trachea central. No carotid bruits. No evidence of JVD.    LYMPH NODES: No palpable lymphadenopathy.    SKIN: Warm, dry and intact. No rash or lesions or wounds or patechiae.    CHEST: Normal AP diameter and normal contour.     LUNGS: Accessory muscles of respiration are not active. Bibasilar rales and rhonchi.  Respiratory expansion equal bilaterally.     CARDIOVASCULAR: RRR. S1, S2 normal without murmur/gallop/rub. No S3, S4. The carotid pulses were normal and 2+ bilaterally without bruits. Peripheral pulses were 2+ and symmetric. Capillary refill less than 3 seconds.    ABDOMEN: Soft, non-tender, non-distended. No masses. No rebound/guarding. No hepatosplenomegaly. Normal bowel sounds.     RECTAL: Not indicated.     EXTREMITIES:  No evidence of cyanosis, clubbing, or edema. No rash or lesions. + pedal pulses. No ulcers or varicosities identified. Pulses are 2+ throughout. No evidence of Pallor, Pulselessness, Poikilothermia (\"coldness\"), Paralysis or Paresthesia. Moves all extremities well. Negative Homans sign " bilaterally.     MUSCULOSKELETAL: Examination of cervical and lumbosacral spines unremarkable. ROM of extremities WNL. No evidence of redness, swelling, warmth or pain of the joints of the extremities. Muscle strength and tone normal.    PSYCHIATRY: Responds appropriately to questions. No evidence of acute anxiety, depression, panic attacks or hallucinations.    MENTAL STATUS EXAMINATION: Neatly dressed, conscious and alert. Speech normal in tone. Pleasant and cooperative. Orientation x3. Thought process, content and insight are normal. Memory without deficits.    NEUROLOGIC: Examination is grossly intact globally with no focal deficits. Cranial nerves II through XII are grossly intact. No motor weakness. No decrease in sensation. No facial asymmetry.        Results Review:    I reviewed the patient's new clinical results.  Lab Results (most recent)     Procedure Component Value Units Date/Time    CBC & Differential [[id number]] Collected:  11/07/17 1052    Specimen:  Blood Updated:  11/07/17 1059    Narrative:       The following orders were created for panel order CBC & Differential.  Procedure                               Abnormality         Status                     ---------                               -----------         ------                     CBC Auto Differential[[id number]]        Abnormal            Final result                 Please view results for these tests on the individual orders.    CBC Auto Differential [[id number]]  (Abnormal) Collected:  11/07/17 1052    Specimen:  Blood from Arm, Left Updated:  11/07/17 1059     WBC 4.00 (L) 10*3/mm3      RBC 3.62 (L) 10*6/mm3      Hemoglobin 11.7 (L) g/dL      Hematocrit 34.9 (L) %      MCV 96.4 fL      MCH 32.3 (H) pg      MCHC 33.5 g/dL      RDW 12.5 %      RDW-SD 43.9 fl      MPV 11.2 (H) fL      Platelets 118 (L) 10*3/mm3      Neutrophil % 72.4 (H) %      Lymphocyte % 8.8 (L) %      Monocyte % 15.0 (H) %      Eosinophil % 3.0 %      Basophil % 0.5 %       Immature Grans % 0.3 %      Neutrophils, Absolute 2.90 10*3/mm3      Lymphocytes, Absolute 0.35 (L) 10*3/mm3      Monocytes, Absolute 0.60 10*3/mm3      Eosinophils, Absolute 0.12 10*3/mm3      Basophils, Absolute 0.02 10*3/mm3      Immature Grans, Absolute 0.01 10*3/mm3      nRBC 0.0 /100 WBC     Comprehensive Metabolic Panel [[id number]]  (Abnormal) Collected:  11/07/17 1052    Specimen:  Blood from Arm, Left Updated:  11/07/17 1118     Glucose 140 (H) mg/dL      BUN 9 mg/dL      Creatinine 0.75 mg/dL      Sodium 141 mmol/L      Potassium 3.6 mmol/L      Chloride 102 mmol/L      CO2 25.9 mmol/L      Calcium 8.6 (L) mg/dL      Total Protein 6.6 g/dL      Albumin 3.80 g/dL      ALT (SGPT) 20 U/L      AST (SGOT) 18 U/L      Alkaline Phosphatase 68 U/L      Total Bilirubin 0.3 mg/dL      eGFR Non African Amer 75 mL/min/1.73      Globulin 2.8 gm/dL      A/G Ratio 1.4 g/dL      BUN/Creatinine Ratio 12.0     Anion Gap 13.1 mmol/L     Narrative:       The MDRD GFR formula is only valid for adults with stable renal function between ages 18 and 70.    Troponin [[id number]]  (Normal) Collected:  11/07/17 1052    Specimen:  Blood from Arm, Left Updated:  11/07/17 1120     Troponin T <0.010 ng/mL     Narrative:       Troponin T Reference Ranges:  Less than 0.03 ng/mL:    Negative for AMI  0.03 to 0.09 ng/mL:      Indeterminant for AMI  Greater than 0.09 ng/mL: Positive for AMI    BNP [[id number]]  (Abnormal) Collected:  11/07/17 1052    Specimen:  Blood from Arm, Left Updated:  11/07/17 1127     proBNP 1446.0 (H) pg/mL     Narrative:       Among patients with dyspnea, NT-proBNP is highly sensitive for the detection of acute congestive heart failure. In addition NT-proBNP of <300 pg/ml effectively rules out acute congestive heart failure with 99% negative predictive value.    [place] Draw [[id number]] Collected:  11/07/17 1052    Specimen:  Blood Updated:  11/07/17 1201    Narrative:       The following orders were created for  panel order [place] Draw.  Procedure                               Abnormality         Status                     ---------                               -----------         ------                     Light Blue Top[[id number]]                                   Final result               Green Top (Gel)[[id number]]                                  Final result               Lavender Top[[id number]]                                     Final result               Gold Top - SST[[id number]]                                                                Please view results for these tests on the individual orders.    Light Blue Top [[id number]] Collected:  11/07/17 1052    Specimen:  Blood from Arm, Left Updated:  11/07/17 1201     Extra Tube hold for add-on      Auto resulted       Green Top (Gel) [[id number]] Collected:  11/07/17 1052    Specimen:  Blood from Arm, Left Updated:  11/07/17 1201     Extra Tube Hold for add-ons.      Auto resulted.       Lavender Top [[id number]] Collected:  11/07/17 1052    Specimen:  Blood from Arm, Left Updated:  11/07/17 1201     Extra Tube hold for add-on      Auto resulted       Lactic Acid, Plasma [[id number]]  (Abnormal) Collected:  11/07/17 1237    Specimen:  Blood Updated:  11/07/17 1302     Lactate 2.5 (C) mmol/L     Procalcitonin [[id number]] Collected:  11/07/17 1310    Specimen:  Blood Updated:  11/07/17 1310    Blood Culture - Blood, [[id number]] Collected:  11/07/17 1310    Specimen:  Blood from Arm, Left Updated:  11/07/17 1349    Blood Culture - Blood, [[id number]] Collected:  11/07/17 1310    Specimen:  Blood from Arm, Right Updated:  11/07/17 1350    Respiratory Panel, PCR - Swab, Nasopharynx [[id number]] Collected:  11/07/17 1436    Specimen:  Swab from Nasopharynx Updated:  11/07/17 1509    Influenza Antigen, Rapid - Swab, Nasopharynx [[id number]]  (Normal) Collected:  11/07/17 1436    Specimen:  Swab from Nasopharynx Updated:  11/07/17 1523     Influenza A Ag, EIA Negative      Influenza B Ag, EIA Negative          Imaging Results (most recent)     Procedure Component Value Units Date/Time    XR Chest 2 View [[id number]] Collected:  11/07/17 1123     Updated:  11/07/17 1127    Narrative:       CHEST X-RAY, 11/07/2017:     HISTORY:   77-year-old female in the ED complaining of one history of shortness of  air, wheezing and productive cough.     TECHNIQUE:  PA and lateral upright chest series.     FINDINGS:  Mild infiltrate is present in the posterior right lung base. Remaining  portions of both lungs are clear. No dense airspace consolidation or  visible pleural effusion. Heart size and pulmonary vascularity are  within normal limits.       Impression:       Right lower lobe infiltrate.     This report was finalized on 11/7/2017 11:24 AM by Dr. Mick Jensen MD.           reviewed    ECG/EMG Results (most recent)     Procedure Component Value Units Date/Time    ECG 12 Lead [[id number]] Collected:  11/07/17 1049     Updated:  11/07/17 1305        reviewed    Assessment/Plan       DIFFERENTIAL DIAGNOSES CONSIDERED FOR CHIEF COMPLAINT:        The following clinical entities were considered in differential diagnosis. The list includes commonly encountered practical probabilities and rare esoteric diagnoses worked out through systemic diagnostic methods used to identify the presence of a disease entity where multiple diagnoses are possible. The decision is reached through either of the following methods: 1) direct confirmatory testing, or 2) a process of elimination, or 3) at least a process of obtaining information that shrinks the “probabilities” of candidate conditions to negligible levels, by using clinical evidence and thus implementing aspects of the hypothetico-deductive method.      DIFFERENTIAL DIAGNOSIS OF SHORTNESS OF BREATH    DIFFERENTIAL DIAGNOSIS OF SHORTNESS OF BREATH:  A) Cardiac: Congestive Heart Failure, Coronary Artery Disease, Myocardial Infarction (recent or past  history), Cardiomyopathy, Valvular dysfunction, Left ventricular hypertrophy, Asymmetric septal hypertrophy, Pericarditis, and arrhythmias   B) Pulmonary: COPD, Asthma, Restrictive Lung Disease, Hereditary lung disorders, and Pneumothorax   C) Mixed cardiac or pulmonary: COPD with pulmonary hypertension and cor pulmonale, Deconditioning, Acute and chronic pulmonary emboli, and Trauma  D) Noncardiac or Nonpulmonary: Metabolic conditions (e.g. acidosis), Pain, Neuromuscular disorders, and Otorhinolaryngeal disorders  E) Functional: Anxiety, Panic disorders, and Hyperventilation          ASSESSMENT AND PLAN:       SUMMARY:    ?   PROPHYLAXIS:   -Oxygen Saturation: As per Nurses' notes.   -DVT Prophylaxis: On Inj. Lovenox and SCDs  -Gastritis Prophylaxis: Pantoprazole 40 mg PO qAM   -Constipation Prophylaxis: colace 100 mg po BID   -Immunizations: N/A  -Intake and Output Orders: As per order sheet   -Kulkarni Catheter: Not indicated at this time  -Smoking/ Nicotine issues: N/A     ACTIVITIES:  -Bathroom Privileges: May use bathroom   -Activity: Encouraged to sit up in side chair for 2-4 hours BID   -PT/OT: N/A      NUTRITION AND FLUIDS:  -Diet/ Nutrition: Regular, cardiac, consistent carbohydrate diet   -Fluid Status/Electrolytes: 1/2NS 1 L 100 mL/Hr      SOCIAL ISSUES:   -MRSA SCREEN: As per institutional protocol   -Behavioral/ Agitation Issues: NONE   -Social Issues: Lives at home with her family.   -Occupational Issues: Patient is Retire at this time.   -Code Status: FULL CODE on admission   -Disposition: Should be able to go back home following discharge     THERAPEUTIC:   ALLERGIES: NKDA   ANTIBIOTICS: Inj Ceftriaxone and Inj Zithromax   PAIN MANAGEMENT: N/A   ANTIPYRETIC: Tylenol 650 mg 1 po q4-6 hours for headache or   temp >100 degrees   INSULIN THERAPY: Low dose insulin coverage as ordered   CHEST PAIN: N/A    NEBULIZER TREATMENT: DuoNeb 3 ml via nebulizer q4-6 hrs PRN for shortness of breath and/or wheezing    ANXIETY: N/A    DEPRESSION: Cymbalta and Prozac    INSOMNIA: Trazodone 25 mg                PLAN:    Labs and diagnostic tests reviewed: Trop <0.010, proBNP 1446.0, Gluc 140, Na 140, K 3.6, Creat 0.75, Venous Lactate 2.5, WBC 4.0, Hb 11.7, Plt 118, 72.4 N, 8.8 L    Diagnostic tests reviewed:      CXR  IMPRESSION:  Right lower lobe infiltrate.      This report was finalized on 11/7/2017 11:24 AM by Dr. Mick Jensen MD.    EKG 11/7/2017  NSR with nonspecific ST-T wave changes        Patient is clinically and hemodynamically stable    Will be seen by Pulmonary consultant in the morning    Any new recommendations: As per Pulmonary    New Labs ordered: Lactic acid at 8:00 PM. CBC, CMP and Lactic acid in AM    New diagnostic tests ordered: N/A    Any changes in medications: Will add Lisinopril    To continue current management and supportive care    Pain management issues: N/A    Discharge planning issues: Should be able to go home once ready for discharge    Will follow patient closely    Nothing new to add for right now        DIAGNOSES:      PRIMARY DIAGNOSES:      Acute hypoxic respiratory failure: O2 sat 58% in ED. Now 96% on 3L O2 via nasal cannula.    RLL Pneumonia with sepsis: On Inj Ceftriaxone and Inj Zithromax. Venous lactate 2.5. WBC 4.00    HTN: Last /72. On Atenolol, Maxzide, and Lisinopril    HLD: On rosuvastatin.    Type II DM: Last blood sugar 146. On Metformin. On SSI.    Thrombocytopenia: Last platelet count 118. Watch.    CAD: Denies any chest pain. Cardiac cath done 0n 3/4/2010. 30-40% spasm of ostial RCA. PDA with 30% mid lesion. Left coronaries normal    Hx of recurrent non-cardiac chest pain: Hx noted. Not an acute issue at this time.    Depression: On Cymbalta and Prozac. Not an acute issue at this time.    Vit D deficiency: On replacement    Insomnia: On Trazodone. Not an acute issue.    Former smoker: Quit a few years ago    DVT Prophylaxis: Inj Lovenox and SCDs      ?      SECONDARY DIAGNOSES:  ?     As above        SURGICAL DIAGNOSES:      As per Problem List      I discussed the patients findings and my recommendations with patient and patient is agreeable to current treatment and management plan.     Cj Ash MD  11/07/17  4:03 PM          Cj Ash M.D., [facility]  Internal Medicine/ Hospitalist        Time:

## 2017-11-08 LAB
ALBUMIN SERPL-MCNC: 3.4 G/DL (ref 3.5–5.2)
ALBUMIN/GLOB SERPL: 1.3 G/DL
ALP SERPL-CCNC: 63 U/L (ref 40–129)
ALT SERPL W P-5'-P-CCNC: 15 U/L (ref 5–33)
ANION GAP SERPL CALCULATED.3IONS-SCNC: 14.4 MMOL/L
AST SERPL-CCNC: 11 U/L (ref 5–32)
BASOPHILS # BLD AUTO: 0 10*3/MM3 (ref 0–0.2)
BASOPHILS NFR BLD AUTO: 0 % (ref 0–2)
BILIRUB SERPL-MCNC: 0.2 MG/DL (ref 0.2–1.2)
BUN BLD-MCNC: 12 MG/DL (ref 8–23)
BUN/CREAT SERPL: 16.4 (ref 7–25)
CALCIUM SPEC-SCNC: 8 MG/DL (ref 8.8–10.5)
CHLORIDE SERPL-SCNC: 100 MMOL/L (ref 98–107)
CO2 SERPL-SCNC: 22.6 MMOL/L (ref 22–29)
CREAT BLD-MCNC: 0.73 MG/DL (ref 0.57–1)
D-LACTATE SERPL-SCNC: 1.8 MMOL/L (ref 0.5–2)
DEPRECATED RDW RBC AUTO: 42.2 FL (ref 37–54)
EOSINOPHIL # BLD AUTO: 0 10*3/MM3 (ref 0.1–0.3)
EOSINOPHIL NFR BLD AUTO: 0 % (ref 0–4)
ERYTHROCYTE [DISTWIDTH] IN BLOOD BY AUTOMATED COUNT: 12.2 % (ref 11.5–14.5)
GFR SERPL CREATININE-BSD FRML MDRD: 77 ML/MIN/1.73
GLOBULIN UR ELPH-MCNC: 2.6 GM/DL
GLUCOSE BLD-MCNC: 243 MG/DL (ref 65–99)
GLUCOSE BLDC GLUCOMTR-MCNC: 150 MG/DL (ref 70–130)
GLUCOSE BLDC GLUCOMTR-MCNC: 188 MG/DL (ref 70–130)
HCT VFR BLD AUTO: 33.6 % (ref 37–47)
HGB BLD-MCNC: 11.4 G/DL (ref 12–16)
IMM GRANULOCYTES # BLD: 0.03 10*3/MM3 (ref 0–0.03)
IMM GRANULOCYTES NFR BLD: 1 % (ref 0–0.5)
LYMPHOCYTES # BLD AUTO: 0.28 10*3/MM3 (ref 0.6–4.8)
LYMPHOCYTES NFR BLD AUTO: 9.6 % (ref 20–45)
MCH RBC QN AUTO: 32.3 PG (ref 27–31)
MCHC RBC AUTO-ENTMCNC: 33.9 G/DL (ref 31–37)
MCV RBC AUTO: 95.2 FL (ref 81–99)
MONOCYTES # BLD AUTO: 0.1 10*3/MM3 (ref 0–1)
MONOCYTES NFR BLD AUTO: 3.4 % (ref 3–8)
NEUTROPHILS # BLD AUTO: 2.5 10*3/MM3 (ref 1.5–8.3)
NEUTROPHILS NFR BLD AUTO: 86 % (ref 45–70)
NRBC BLD MANUAL-RTO: 0 /100 WBC (ref 0–0)
PLATELET # BLD AUTO: 81 10*3/MM3 (ref 140–500)
PMV BLD AUTO: 12 FL (ref 7.4–10.4)
POTASSIUM BLD-SCNC: 4 MMOL/L (ref 3.5–5.2)
PROT SERPL-MCNC: 6 G/DL (ref 6–8.5)
RBC # BLD AUTO: 3.53 10*6/MM3 (ref 4.2–5.4)
SODIUM BLD-SCNC: 137 MMOL/L (ref 136–145)
TROPONIN T SERPL-MCNC: <0.01 NG/ML (ref 0–0.03)
TROPONIN T SERPL-MCNC: <0.01 NG/ML (ref 0–0.03)
WBC NRBC COR # BLD: 2.91 10*3/MM3 (ref 4.8–10.8)

## 2017-11-08 PROCEDURE — 84484 ASSAY OF TROPONIN QUANT: CPT | Performed by: INTERNAL MEDICINE

## 2017-11-08 PROCEDURE — 80053 COMPREHEN METABOLIC PANEL: CPT | Performed by: INTERNAL MEDICINE

## 2017-11-08 PROCEDURE — 25010000002 CEFTRIAXONE PER 250 MG: Performed by: INTERNAL MEDICINE

## 2017-11-08 PROCEDURE — 25010000002 AZITHROMYCIN PER 500 MG: Performed by: INTERNAL MEDICINE

## 2017-11-08 PROCEDURE — 85025 COMPLETE CBC W/AUTO DIFF WBC: CPT | Performed by: INTERNAL MEDICINE

## 2017-11-08 PROCEDURE — 94799 UNLISTED PULMONARY SVC/PX: CPT

## 2017-11-08 PROCEDURE — 25010000002 METHYLPREDNISOLONE PER 125 MG: Performed by: INTERNAL MEDICINE

## 2017-11-08 PROCEDURE — 25010000002 ENOXAPARIN PER 10 MG: Performed by: INTERNAL MEDICINE

## 2017-11-08 PROCEDURE — 82962 GLUCOSE BLOOD TEST: CPT

## 2017-11-08 PROCEDURE — 63710000001 INSULIN ASPART PER 5 UNITS: Performed by: FAMILY MEDICINE

## 2017-11-08 PROCEDURE — 83605 ASSAY OF LACTIC ACID: CPT | Performed by: INTERNAL MEDICINE

## 2017-11-08 RX ORDER — NICOTINE POLACRILEX 4 MG
15 LOZENGE BUCCAL
Status: DISCONTINUED | OUTPATIENT
Start: 2017-11-08 | End: 2017-11-10 | Stop reason: HOSPADM

## 2017-11-08 RX ORDER — DEXTROSE MONOHYDRATE 25 G/50ML
25 INJECTION, SOLUTION INTRAVENOUS
Status: DISCONTINUED | OUTPATIENT
Start: 2017-11-08 | End: 2017-11-10 | Stop reason: HOSPADM

## 2017-11-08 RX ADMIN — LISINOPRIL 20 MG: 20 TABLET ORAL at 08:39

## 2017-11-08 RX ADMIN — INSULIN ASPART 2 UNITS: 100 INJECTION, SOLUTION INTRAVENOUS; SUBCUTANEOUS at 17:55

## 2017-11-08 RX ADMIN — PANTOPRAZOLE SODIUM 40 MG: 40 TABLET, DELAYED RELEASE ORAL at 05:45

## 2017-11-08 RX ADMIN — TRIAMTERENE AND HYDROCHLOROTHIAZIDE 1 TABLET: 37.5; 25 TABLET ORAL at 08:39

## 2017-11-08 RX ADMIN — SODIUM CHLORIDE 100 ML/HR: 4.5 INJECTION, SOLUTION INTRAVENOUS at 05:19

## 2017-11-08 RX ADMIN — FLUOXETINE 10 MG: 10 CAPSULE ORAL at 08:39

## 2017-11-08 RX ADMIN — IPRATROPIUM BROMIDE AND ALBUTEROL SULFATE 3 ML: .5; 3 SOLUTION RESPIRATORY (INHALATION) at 07:18

## 2017-11-08 RX ADMIN — ROSUVASTATIN CALCIUM 5 MG: 5 TABLET, FILM COATED ORAL at 08:39

## 2017-11-08 RX ADMIN — IPRATROPIUM BROMIDE AND ALBUTEROL SULFATE 3 ML: .5; 3 SOLUTION RESPIRATORY (INHALATION) at 11:41

## 2017-11-08 RX ADMIN — IPRATROPIUM BROMIDE AND ALBUTEROL SULFATE 3 ML: .5; 3 SOLUTION RESPIRATORY (INHALATION) at 21:42

## 2017-11-08 RX ADMIN — ASPIRIN 81 MG 81 MG: 81 TABLET ORAL at 08:39

## 2017-11-08 RX ADMIN — METHYLPREDNISOLONE SODIUM SUCCINATE 60 MG: 125 INJECTION, POWDER, FOR SOLUTION INTRAMUSCULAR; INTRAVENOUS at 05:14

## 2017-11-08 RX ADMIN — TRAZODONE HYDROCHLORIDE 50 MG: 50 TABLET ORAL at 22:29

## 2017-11-08 RX ADMIN — AZITHROMYCIN 500 MG: 500 INJECTION, POWDER, LYOPHILIZED, FOR SOLUTION INTRAVENOUS at 13:17

## 2017-11-08 RX ADMIN — ENOXAPARIN SODIUM 40 MG: 40 INJECTION SUBCUTANEOUS at 17:56

## 2017-11-08 RX ADMIN — METFORMIN HYDROCHLORIDE 1000 MG: 500 TABLET ORAL at 08:39

## 2017-11-08 RX ADMIN — DOCUSATE SODIUM 100 MG: 100 CAPSULE, LIQUID FILLED ORAL at 17:56

## 2017-11-08 RX ADMIN — ATENOLOL 50 MG: 50 TABLET ORAL at 08:39

## 2017-11-08 RX ADMIN — CEFTRIAXONE 1 G: 1 INJECTION, SOLUTION INTRAVENOUS at 12:16

## 2017-11-08 RX ADMIN — IPRATROPIUM BROMIDE AND ALBUTEROL SULFATE 3 ML: .5; 3 SOLUTION RESPIRATORY (INHALATION) at 15:40

## 2017-11-08 RX ADMIN — DOCUSATE SODIUM 100 MG: 100 CAPSULE, LIQUID FILLED ORAL at 08:39

## 2017-11-08 NOTE — PLAN OF CARE
Problem: Infection, Risk/Actual (Adult)  Goal: Identify Related Risk Factors and Signs and Symptoms  Outcome: Outcome(s) achieved Date Met:  11/08/17  Goal: Infection Prevention/Resolution  Outcome: Ongoing (interventions implemented as appropriate)    Problem: Patient Care Overview (Adult)  Goal: Plan of Care Review  Outcome: Ongoing (interventions implemented as appropriate)    11/08/17 0331   Coping/Psychosocial Response Interventions   Plan Of Care Reviewed With patient   Patient Care Overview   Progress progress toward functional goals as expected   Outcome Evaluation   Outcome Summary/Follow up Plan PNA, restless overnight, RVP came back positive for Flu, VSS, Tele SR, PACs,        Goal: Adult Individualization and Mutuality  Outcome: Ongoing (interventions implemented as appropriate)

## 2017-11-08 NOTE — NURSING NOTE
Discharge Planning Assessment  COLLEEN Fitzgerald     Patient Name: Palma Paredes  MRN: [id number]  Today's Date: 11/8/2017    Admit Date: 11/7/2017          Discharge Needs Assessment       11/08/17 0933    Living Environment    Lives With child(lizeth), adult    Living Arrangements condominium    Home Accessibility no concerns    Type of Financial/Environmental Concern none    Transportation Available car;family or friend will provide    Living Environment    Provides Primary Care For no one    Able to Return to Prior Living Arrangements yes    Discharge Needs Assessment    Concerns To Be Addressed denies needs/concerns at this time    Readmission Within The Last 30 Days no previous admission in last 30 days    Equipment Currently Used at Home none            Discharge Plan       11/08/17 0934    Case Management/Social Work Plan    Plan Plan is home.    Patient/Family In Agreement With Plan unable to assess    Additional Comments Patient in agreement with speaking to  at bedside.  She lives alone in a multi level condo.  She is able to stay on one level.  Her daughter and son in law is staying with her at this time while their house is being built.  She also has two sons that live locally.  She has been independent with ADLs and still drives.  She has no home health, home oxygen, medical equipment, nebulizer or CPAP/BIPAP.  She fills scripts at [facility] and does not have any issues getting her medicines.  Verified face sheet information.  She does not have a Living Will but she says that the daughter has the information.  She says the plan is home.   will continue to follow.         Discharge Placement     No information found                Demographic Summary       11/08/17 0933    Referral Information    Admission Type inpatient    Arrived From home or self-care    Contact Information    Permission Granted to Share Information With             Functional Status     None             Psychosocial     None            Abuse/Neglect     None            Legal     None            Substance Abuse     None            Patient Forms     None          Renetta Cordoba RN

## 2017-11-08 NOTE — PLAN OF CARE
Problem: Patient Care Overview (Adult)  Goal: Discharge Needs Assessment  Outcome: Ongoing (interventions implemented as appropriate)    11/08/17 0933 11/08/17 0942   Discharge Needs Assessment   Concerns To Be Addressed denies needs/concerns at this time --    Readmission Within The Last 30 Days no previous admission in last 30 days --    Discharge Planning Comments --  Patient in agreement with speaking to  at bedside. She lives alone in a multi level condo. She is able to stay on one level. Her daughter and son in law is staying with her at this time while their house is being built. She also has two sons that live locally. She has been independent with ADLs and still drives. She has no home health, home oxygen, medical equipment, nebulizer or CPAP/BIPAP. She fills scripts at [facility] and does not have any issues getting her medicines. Verified face sheet information. She does not have a Living Will but she says that the daughter has the information. She says the plan is home.  will continue to follow.    Living Environment   Transportation Available car;family or friend will provide --    Self-Care   Equipment Currently Used at Home none --

## 2017-11-08 NOTE — PROGRESS NOTES
"Daily Progress Note:    Subjective: Is feeling much better.  Less short of breath but still gets exertional dyspnea.  No fever chills, mild nonproductive cough persists    Flowsheet Rows         First Filed Value    Admission Height  62\" (157.5 cm) Documented at 11/07/2017 1049    Admission Weight  179 lb 3.2 oz (81.3 kg) Documented at 11/07/2017 1049          Patient Vitals for the past 24 hrs:   BP Temp Temp src Pulse Resp SpO2 Height Weight   11/08/17 1157 - - - - - 94 % - -   11/08/17 1141 - - - 72 16 98 % - -   11/08/17 1100 154/72 98 °F (36.7 °C) Oral 73 18 95 % - -   11/08/17 0734 - - - - - 94 % - -   11/08/17 0718 - - - 67 18 97 % - -   11/08/17 0650 130/64 98.2 °F (36.8 °C) Oral 64 16 94 % - -   11/08/17 0343 160/68 96.8 °F (36 °C) Oral 58 16 95 % - -   11/08/17 0007 123/67 98.6 °F (37 °C) Oral 64 16 96 % - -   11/07/17 2024 146/64 98.8 °F (37.1 °C) Oral 64 16 96 % - -   11/07/17 1753 - - - 70 24 96 % - -   11/07/17 1700 - - - 70 - - - -   11/07/17 1500 (!) 187/72 98.5 °F (36.9 °C) Oral 72 20 96 % 62\" (157.5 cm) 175 lb 6.4 oz (79.6 kg)   11/07/17 1433 152/69 - - 64 - (!) 89 % - -   11/07/17 1417 146/81 - - 69 - 92 % - -   11/07/17 1403 151/80 - - 74 - 91 % - -   11/07/17 1347 157/61 - - 67 - 90 % - -   11/07/17 1332 169/71 - - 78 - 92 % - -   11/07/17 1318 177/77 - - 68 - 91 % - -   11/07/17 1303 165/76 - - 64 - 96 % - -   11/07/17 1247 156/74 - - 66 - 96 % - -         Intake/Output Summary (Last 24 hours) at 11/08/17 1232  Last data filed at 11/08/17 0900   Gross per 24 hour   Intake             2490 ml   Output                0 ml   Net             2490 ml       Review of Systems   Constitutional: Positive for activity change, appetite change and fatigue.   Respiratory: Positive for cough and shortness of breath. Negative for chest tightness and wheezing.    Cardiovascular: Negative for chest pain.   Gastrointestinal: Negative for abdominal distention, abdominal pain, diarrhea, nausea and vomiting. "   Genitourinary: Negative for frequency.   Skin: Negative for rash.   Psychiatric/Behavioral: Negative for agitation.       Physical Exam   Constitutional: She appears well-developed and well-nourished.   HENT:   Head: Normocephalic.   Mouth/Throat: Oropharynx is clear and moist.   Eyes: Conjunctivae are normal.   Neck: Normal range of motion. No JVD present. No thyromegaly present.   Cardiovascular: Normal rate, regular rhythm and normal heart sounds.    No murmur heard.  Pulmonary/Chest: Effort normal. No respiratory distress. She has decreased breath sounds in the right lower field and the left lower field. She has wheezes in the right upper field, the right middle field, the right lower field, the left upper field, the left middle field and the left lower field. She has rales in the right lower field.   Abdominal: Soft. Bowel sounds are normal. She exhibits no distension. There is no tenderness. There is no guarding.   Neurological: She is alert.   Skin: Skin is warm and dry. No rash noted.   Nursing note and vitals reviewed.          Medication Review:   I have reviewed the patient's current medication list      aspirin 81 mg Oral Daily   atenolol 50 mg Oral Daily   azithromycin 500 mg Intravenous Q24H   ceftriaxone 1 g Intravenous Q24H   docusate sodium 100 mg Oral BID   DULoxetine 30 mg Oral Daily   enoxaparin 40 mg Subcutaneous Q24H   FLUoxetine 10 mg Oral Daily   ipratropium-albuterol 3 mL Nebulization 4x Daily - RT   lisinopril 20 mg Oral Q24H   metFORMIN 1,000 mg Oral Daily With Breakfast   pantoprazole 40 mg Oral Q AM   rosuvastatin 5 mg Oral Daily   traZODone 50 mg Oral Nightly   triamterene-hydrochlorothiazide 1 tablet Oral Daily           sodium chloride 100 mL/hr Last Rate: 100 mL/hr (11/08/17 0519)           Labs:    Results from last 7 days  Lab Units 11/08/17  0628 11/07/17  1052   WBC 10*3/mm3 2.91* 4.00*   HEMOGLOBIN g/dL 11.4* 11.7*   HEMATOCRIT % 33.6* 34.9*   PLATELETS 10*3/mm3 81* 118*  "      Results from last 7 days  Lab Units 11/08/17  0628 11/07/17  1052   SODIUM mmol/L 137 141   POTASSIUM mmol/L 4.0 3.6   CHLORIDE mmol/L 100 102   CO2 mmol/L 22.6 25.9   BUN mg/dL 12 9   CREATININE mg/dL 0.73 0.75   CALCIUM mg/dL 8.0* 8.6*   BILIRUBIN mg/dL 0.2 0.3   ALK PHOS U/L 63 68   ALT (SGPT) U/L 15 20   AST (SGOT) U/L 11 18   GLUCOSE mg/dL 243* 140*           Lab Results (last 24 hours)     Procedure Component Value Units Date/Time    Lactic Acid, Plasma [[id number]]  (Abnormal) Collected:  11/07/17 1237    Specimen:  Blood Updated:  11/07/17 1302     Lactate 2.5 (C) mmol/L     Influenza Antigen, Rapid - Swab, Nasopharynx [[id number]]  (Normal) Collected:  11/07/17 1436    Specimen:  Swab from Nasopharynx Updated:  11/07/17 1523     Influenza A Ag, EIA Negative     Influenza B Ag, EIA Negative    Procalcitonin [[id number]]  (Abnormal) Collected:  11/07/17 1310    Specimen:  Blood Updated:  11/07/17 1618     Procalcitonin 0.04 (L) ng/mL     Narrative:       As a Marker for Sepsis (Non-Neonates):   1. <0.5 ng/mL represents a low risk of severe sepsis and/or septic shock.  1. >2 ng/mL represents a high risk of severe sepsis and/or septic shock.    As a Marker for Lower Respiratory Tract Infections that require antibiotic therapy:  PCT on Admission     Antibiotic Therapy             6-12 Hrs later  > 0.5                Strongly Recommended            >0.25 - <0.5         Recommended  0.1 - 0.25           Discouraged                   Remeasure/reassess PCT  <0.1                 Strongly Discouraged          Remeasure/reassess PCT      As 28 day mortality risk marker: \"Change in Procalcitonin Result\" (> 80 % or <=80 %) if Day 0 (or Day 1) and Day 4 values are available. Refer to http://www.[employer]-pct-calculator.com/   Change in PCT <=80 %   A decrease of PCT levels below or equal to 80 % defines a positive change in PCT test result representing a higher risk for 28-day all-cause mortality of patients diagnosed " with severe sepsis or septic shock.  Change in PCT > 80 %   A decrease of PCT levels of more than 80 % defines a negative change in PCT result representing a lower risk for 28-day all-cause mortality of patients diagnosed with severe sepsis or septic shock.                Troponin [[id number]]  (Normal) Collected:  11/07/17 1823    Specimen:  Blood Updated:  11/07/17 1847     Troponin T <0.010 ng/mL     Narrative:       Troponin T Reference Ranges:  Less than 0.03 ng/mL:    Negative for AMI  0.03 to 0.09 ng/mL:      Indeterminant for AMI  Greater than 0.09 ng/mL: Positive for AMI    Lactic Acid, Reflex [[id number]]  (Abnormal) Collected:  11/07/17 1850    Specimen:  Blood Updated:  11/07/17 1922     Lactate 2.3 (C) mmol/L     Respiratory Panel, PCR - Swab, Nasopharynx [[id number]]  (Abnormal) Collected:  11/07/17 1436    Specimen:  Swab from Nasopharynx Updated:  11/07/17 2310     ADENOVIRUS, PCR Not Detected     Coronavirus 229E Not Detected     Coronavirus HKU1 Not Detected     Coronavirus NL63 Not Detected     Coronavirus OC43 Not Detected     Human Metapneumovirus Not Detected     Human Rhinovirus/Enterovirus Not Detected     Influenza B PCR Not Detected     Parainfluenza Virus 1 Not Detected     Parainfluenza Virus 2 Not Detected     Parainfluenza Virus 3 Not Detected     Parainfluenza Virus 4 Detected (A)     Bordetella pertussis pcr Not Detected     Influenza 2009 H1N1 by PCR Not Detected     Chlamydophila pneumoniae PCR Not Detected     Mycoplasma pneumo by PCR Not Detected     Influenza A PCR Not Detected     Influenza A H3 Not Detected     Influenza A H1 Not Detected     RSV, PCR Not Detected    Blood Culture - Blood, [[id number]]  (Normal) Collected:  11/07/17 1310    Specimen:  Blood from Arm, Right Updated:  11/08/17 0201     Blood Culture No growth at less than 24 hours    Blood Culture - Blood, [[id number]]  (Normal) Collected:  11/07/17 1310    Specimen:  Blood from Arm, Left Updated:  11/08/17 0201      Blood Culture No growth at less than 24 hours    [facility] Draw [[id number]] Collected:  11/07/17 1052    Specimen:  Blood Updated:  11/08/17 0541    Narrative:       The following orders were created for panel order [place] Draw.  Procedure                               Abnormality         Status                     ---------                               -----------         ------                     Light Blue Top[[id number]]                                   Final result               Green Top (Gel)[[id number]]                                  Final result               Lavender Top[[id number]]                                     Final result               Gold Top - SST[[id number]]                                                                Please view results for these tests on the individual orders.    CBC Auto Differential [[id number]]  (Abnormal) Collected:  11/08/17 0628    Specimen:  Blood Updated:  11/08/17 0701     WBC 2.91 (L) 10*3/mm3      RBC 3.53 (L) 10*6/mm3      Hemoglobin 11.4 (L) g/dL      Hematocrit 33.6 (L) %      MCV 95.2 fL      MCH 32.3 (H) pg      MCHC 33.9 g/dL      RDW 12.2 %      RDW-SD 42.2 fl      MPV 12.0 (H) fL      Platelets 81 (L) 10*3/mm3      Neutrophil % 86.0 (H) %      Lymphocyte % 9.6 (L) %      Monocyte % 3.4 %      Eosinophil % 0.0 %      Basophil % 0.0 %      Immature Grans % 1.0 (H) %      Neutrophils, Absolute 2.50 10*3/mm3      Lymphocytes, Absolute 0.28 (L) 10*3/mm3      Monocytes, Absolute 0.10 10*3/mm3      Eosinophils, Absolute 0.00 (L) 10*3/mm3      Basophils, Absolute 0.00 10*3/mm3      Immature Grans, Absolute 0.03 10*3/mm3      nRBC 0.0 /100 WBC     Troponin [[id number]]  (Normal) Collected:  11/08/17 0628    Specimen:  Blood Updated:  11/08/17 0714     Troponin T <0.010 ng/mL     Narrative:       Troponin T Reference Ranges:  Less than 0.03 ng/mL:    Negative for AMI  0.03 to 0.09 ng/mL:      Indeterminant for AMI  Greater than 0.09 ng/mL: Positive for AMI     Comprehensive Metabolic Panel [[id number]]  (Abnormal) Collected:  11/08/17 0628    Specimen:  Blood Updated:  11/08/17 0717     Glucose 243 (H) mg/dL      BUN 12 mg/dL      Creatinine 0.73 mg/dL      Sodium 137 mmol/L      Potassium 4.0 mmol/L      Chloride 100 mmol/L      CO2 22.6 mmol/L      Calcium 8.0 (L) mg/dL      Total Protein 6.0 g/dL      Albumin 3.40 (L) g/dL      ALT (SGPT) 15 U/L      AST (SGOT) 11 U/L      Alkaline Phosphatase 63 U/L      Total Bilirubin 0.2 mg/dL      eGFR Non African Amer 77 mL/min/1.73      Globulin 2.6 gm/dL      A/G Ratio 1.3 g/dL      BUN/Creatinine Ratio 16.4     Anion Gap 14.4 mmol/L     Narrative:       The MDRD GFR formula is only valid for adults with stable renal function between ages 18 and 70.    Troponin [[id number]]  (Normal) Collected:  11/08/17 0629    Specimen:  Blood Updated:  11/08/17 0728     Troponin T <0.010 ng/mL     Narrative:       Troponin T Reference Ranges:  Less than 0.03 ng/mL:    Negative for AMI  0.03 to 0.09 ng/mL:      Indeterminant for AMI  Greater than 0.09 ng/mL: Positive for AMI    Lactic Acid, Plasma [[id number]]  (Normal) Collected:  11/08/17 0800    Specimen:  Blood Updated:  11/08/17 0828     Lactate 1.8 mmol/L               Radiology:  Imaging Results (last 24 hours)     ** No results found for the last 24 hours. **          Cardiology:  ECG/EMG Results (last 24 hours)     Procedure Component Value Units Date/Time    ECG 12 Lead [[id number]] Collected:  11/07/17 1049     Updated:  11/07/17 1801    Narrative:       RR Interval= 857 ms  MS Interval= 144 ms  QRSD Interval= 72 ms  QT Interval= 424 ms  QTc Interval= 458 ms  Heart Rate= 70 ms  P Axis= 32 deg  QRS Axis= 24 deg  T Wave Axis= 58 deg  I: 40 Axis= 29 deg  T: 40 Axis= 38 deg  ST Axis= 182 deg  SINUS RHYTHM  NO PRIOR TRACING AVAILABLE FOR COMPARISON  Electronically Signed by:  Sammy TrevinoGILBERTO) ([facility]) 07-Nov-2017 17:57:27  Date and Time of Study: 2017-11-07 10:49:18          I have  reviewed consult notes.    Assessment and Plan:    1.  Viral infection with parainfluenza virus    2.  Right lower lobe pneumonia can be required likely bacterial will continue Rocephin and Zithromax    3.  Sepsis resolved we will DC IV fluids    4.  Acute hypoxic respiratory failure is improving continue with breathing treatments and oxygen supplementation as needed    5.  Adult-onset diabetes mellitus continue Accu-Cheks sliding scale insulin    6.  Hypertension moderately controlled has been labile likely due to anxiety as well as acute viral illness will continue to monitor    7.  Coronary disease nothing acute no change medications    8.  Pancytopenia likely due to viral illness will continue to monitor

## 2017-11-08 NOTE — CONSULTS
Patient Care Team:  Sandra Foley MD as PCP - General  Sandra Foley MD      Subjective     Patient is a 77 y.o. female.  I am asked to see in consultation regarding pneumonia and respiratory failure.  Patient says she was doing pretty well in her normal state of health until the night prior to admission.  Started feeling poorly a little bit of congestion apparently her daughter noted her voice to be a little raspy.  The time she awoke the next morning she was feeling very badly short of breath with cough not producing any sputum.  No chest pain or pressure just the shortness of breath.  No nausea vomiting or diarrhea minimal runny nose no sore throat.      Review of Systems:  No recent weight loss, no definite fevers or chills.  No difficulty swallowing no bad heartburn or indigestion no history of ulcer disease no history of liver disease or hepatitis no recent change in bowel habits no melena or hematochezia.  No history of kidney disease no dysuria hematuria and urgency or frequency.  No history of lung disease nonsmoker never smoker actually no history of asthma.  No history of TB or TB exposure.  No seizure strokes no recent headaches or visual changes.  No history of blood clots easy bleeding or bruising.  She does have a history of hypertension been treated for while she has a history of some nonocclusive coronary artery disease and she's been a borderline diabetic and hyperlipidemic.  Normally she doesn't have any respiratory difficulties no shortness of breath with exertion      History  Past Medical History:   Diagnosis Date   • Adverse reaction to metoprolol     Nightmares, vivid dreams, and sleep disturbance with metoprolol in the past   • CAD (coronary artery disease)     Cath 3/4/10: 30-40% spasm of ostial RCA.  PDA with 30% mid lesion.  Left coronaries normal.   • Chest pain     History of recurrent non-cardiac chest pain   • Diabetes mellitus    • Hyperlipidemia    • Hypertension       Past Surgical History:   Procedure Laterality Date   • BILATERAL BREAST REDUCTION     • TOTAL ABDOMINAL HYSTERECTOMY WITH SALPINGO OOPHORECTOMY       Social History     Social History   • Marital status: Single     Spouse name: N/A   • Number of children: N/A   • Years of education: N/A     Social History Main Topics   • Smoking status: Former Smoker   • Smokeless tobacco: Never Used   • Alcohol use No      Comment: Daily caffeine use   • Drug use: No   • Sexual activity: Defer     Other Topics Concern   • None     Social History Narrative   • None     Family History   Problem Relation Age of Onset   • Heart disease Mother      pacemaker   • Hypertension Mother    • Heart attack Maternal Grandmother    • Heart attack Maternal Grandfather    • Hypertension Father    • Other Sister      small facial stroke         Allergies:  Review of patient's allergies indicates no known allergies.    Medications:  Prior to Admission medications    Medication Sig Start Date End Date Taking? Authorizing Provider   aspirin 81 MG tablet Take 81 mg by mouth Daily. 12/11/15  Yes Historical Provider, MD   atenolol (TENORMIN) 50 MG tablet Take 50 mg by mouth Daily. 12/11/15  Yes Historical Provider, MD   FLUoxetine (PROzac) 10 MG capsule Take 10 mg by mouth Daily.   Yes Historical Provider, MD   metFORMIN (GLUCOPHAGE) 1000 MG tablet Take 1,000 mg by mouth Daily With Breakfast. 12/7/15  Yes Historical Provider, MD   Potassium 99 MG tablet Take  by mouth Daily.   Yes Historical Provider, MD   rosuvastatin (CRESTOR) 20 MG tablet Take 5 mg by mouth Daily. 5/31/16  Yes Historical Provider, MD   spironolactone-hydrochlorothiazide (ALDACTAZIDE) 25-25 MG tablet Take  by mouth Daily. 8/31/15  Yes Historical Provider, MD   traZODone (DESYREL) 50 MG tablet Take 25 mg by mouth Every Night. 12/7/15  Yes Historical Provider, MD DAUGHERTYOLIN  (90 BASE) MCG/ACT inhaler 2 puffs Every 4 (Four) Hours As Needed for Wheezing. 3/1/17  Yes Historical  "Provider, MD   DULoxetine (CYMBALTA) 30 MG capsule Take 30 mg by mouth Daily. 12/11/15   Historical Provider, MD   MethylPREDNISolone (MEDROL, BAN,) 4 MG tablet Take as directed on package instructions. 5/4/17   Sanford Shoemaker MD   vitamin D (ERGOCALCIFEROL) 27341 UNITS capsule capsule Take 50,000 Units by mouth Every 7 (Seven) Days. 12/7/15   Historical Provider, MD   zolpidem (AMBIEN) 10 MG tablet Take 10 mg by mouth Daily. 4/21/17   Historical Provider, MD       aspirin 81 mg Oral Daily   atenolol 50 mg Oral Daily   azithromycin 500 mg Intravenous Q24H   ceftriaxone 1 g Intravenous Q24H   docusate sodium 100 mg Oral BID   DULoxetine 30 mg Oral Daily   enoxaparin 40 mg Subcutaneous Q24H   FLUoxetine 10 mg Oral Daily   ipratropium-albuterol 3 mL Nebulization 4x Daily - RT   lisinopril 20 mg Oral Q24H   metFORMIN 1,000 mg Oral Daily With Breakfast   pantoprazole 40 mg Oral Q AM   rosuvastatin 5 mg Oral Daily   traZODone 50 mg Oral Nightly   triamterene-hydrochlorothiazide 1 tablet Oral Daily       sodium chloride 100 mL/hr Last Rate: 100 mL/hr (11/08/17 0519)       Objective     Vital Signs  Vital Sign Min/Max for last 24 hours  Temp  Min: 96.8 °F (36 °C)  Max: 99 °F (37.2 °C)   BP  Min: 123/67  Max: 200/92   Pulse  Min: 58  Max: 78   Resp  Min: 16  Max: 24   SpO2  Min: 89 %  Max: 98 %   Flow (L/min)  Min: 2  Max: 3   Weight  Min: 175 lb 6.4 oz (79.6 kg)  Max: 179 lb 3.2 oz (81.3 kg)       Intake/Output Summary (Last 24 hours) at 11/08/17 0634  Last data filed at 11/08/17 0517   Gross per 24 hour   Intake             2250 ml   Output                0 ml   Net             2250 ml     I/O this shift:  In: 1000 [I.V.:1000]  Out: -   Last Weight and Admission Weight    Last Weight    11/07/17  1500   Weight: 175 lb 6.4 oz (79.6 kg)     Flowsheet Rows         First Filed Value    Admission Height  62\" (157.5 cm) Documented at 11/07/2017 1049    Admission Weight  179 lb 3.2 oz (81.3 kg) Documented at 11/07/2017 1049 "          Body mass index is 32.08 kg/(m^2).           Physical Exam:  General Appearance: Well-developed white female resting comfortably in bed on 3 L nasal cannula O2 with saturations of about 96%  Eyes: Conjunctiva are clear and anicteric pupils are equal and reactive to light  ENT: Nasal septum nasal mucosa is a little dry oral mucous membranes are moist no erythema or exudates she has a Mallampati type I airway  Neck: No adenopathy or thyromegaly no jugular venous distention trachea midline  Lungs: Very minimal crackles at the right base sort of course crackles no dullness on percussion no wheezing symmetric nonlabored chest expansion  Cardiac: Regular rate and rhythm no murmur or gallop  Abdomen: Soft nontender no palpable organomegaly or masses moderately obese  : Not examined  Musc/Skel: Grossly normal  Skin: No jaundice, no petechiae, no rashes noted  Neuro: She is alert and oriented ×3 she is pleasant and cooperative she is moving all extremities following commands and grossly intact  Extremities/P Vascular: No clubbing cyanosis or edema she has palpable radial and dorsalis pedis pulses  MSE: Seems to be in reasonably good spirits      Labs:    Results from last 7 days  Lab Units 11/07/17  1052   GLUCOSE mg/dL 140*   SODIUM mmol/L 141   POTASSIUM mmol/L 3.6   CO2 mmol/L 25.9   CHLORIDE mmol/L 102   ANION GAP mmol/L 13.1   CREATININE mg/dL 0.75   BUN mg/dL 9   BUN / CREAT RATIO  12.0   CALCIUM mg/dL 8.6*   EGFR IF NONAFRICN AM mL/min/1.73 75   ALK PHOS U/L 68   TOTAL PROTEIN g/dL 6.6   ALT (SGPT) U/L 20   AST (SGOT) U/L 18   BILIRUBIN mg/dL 0.3   ALBUMIN g/dL 3.80   GLOBULIN gm/dL 2.8   A/G RATIO g/dL 1.4     Estimated Creatinine Clearance: 55.7 mL/min (by C-G formula based on Cr of 0.75).        Results from last 7 days  Lab Units 11/07/17  1052   WBC 10*3/mm3 4.00*   RBC 10*6/mm3 3.62*   HEMOGLOBIN g/dL 11.7*   HEMATOCRIT % 34.9*   MCV fL 96.4   MCH pg 32.3*   MCHC g/dL 33.5   RDW % 12.5   RDW-SD fl  43.9   MPV fL 11.2*   PLATELETS 10*3/mm3 118*   NEUTROPHIL % % 72.4*   LYMPHOCYTE % % 8.8*   MONOCYTES % % 15.0*   EOSINOPHIL % % 3.0   BASOPHIL % % 0.5   IMM GRAN % % 0.3   NEUTROS ABS 10*3/mm3 2.90   LYMPHS ABS 10*3/mm3 0.35*   MONOS ABS 10*3/mm3 0.60   EOS ABS 10*3/mm3 0.12   BASOS ABS 10*3/mm3 0.02   IMMATURE GRANS (ABS) 10*3/mm3 0.01   NRBC /100 WBC 0.0           Results from last 7 days  Lab Units 11/07/17  1823 11/07/17  1052   TROPONIN T ng/mL <0.010 <0.010       Results from last 7 days  Lab Units 11/07/17  1052   PROBNP pg/mL 1446.0*           Results from last 7 days  Lab Units 11/07/17  1850 11/07/17  1310 11/07/17  1237   LACTATE mmol/L 2.3*  --  2.5*   PROCALCITONIN ng/mL  --  0.04*  --          Microbiology Results (last 10 days)     Procedure Component Value - Date/Time    Respiratory Panel, PCR - Swab, Nasopharynx [[id number]]  (Abnormal) Collected:  11/07/17 1436    Lab Status:  Final result Specimen:  Swab from Nasopharynx Updated:  11/07/17 2310     ADENOVIRUS, PCR Not Detected     Coronavirus 229E Not Detected     Coronavirus HKU1 Not Detected     Coronavirus NL63 Not Detected     Coronavirus OC43 Not Detected     Human Metapneumovirus Not Detected     Human Rhinovirus/Enterovirus Not Detected     Influenza B PCR Not Detected     Parainfluenza Virus 1 Not Detected     Parainfluenza Virus 2 Not Detected     Parainfluenza Virus 3 Not Detected     Parainfluenza Virus 4 Detected (A)     Bordetella pertussis pcr Not Detected     Influenza 2009 H1N1 by PCR Not Detected     Chlamydophila pneumoniae PCR Not Detected     Mycoplasma pneumo by PCR Not Detected     Influenza A PCR Not Detected     Influenza A H3 Not Detected     Influenza A H1 Not Detected     RSV, PCR Not Detected    Influenza Antigen, Rapid - Swab, Nasopharynx [[id number]]  (Normal) Collected:  11/07/17 1436    Lab Status:  Final result Specimen:  Swab from Nasopharynx Updated:  11/07/17 1523     Influenza A Ag, EIA Negative     Influenza B  Ag, EIA Negative    Blood Culture - Blood, [[id number]]  (Normal) Collected:  11/07/17 1310    Lab Status:  Preliminary result Specimen:  Blood from Arm, Left Updated:  11/08/17 0201     Blood Culture No growth at less than 24 hours    Blood Culture - Blood, [[id number]]  (Normal) Collected:  11/07/17 1310    Lab Status:  Preliminary result Specimen:  Blood from Arm, Right Updated:  11/08/17 0201     Blood Culture No growth at less than 24 hours            Diagnostics:  Xr Chest 2 View    Result Date: 11/7/2017  CHEST X-RAY, 11/07/2017:  HISTORY: 77-year-old female in the ED complaining of one history of shortness of air, wheezing and productive cough.  TECHNIQUE: PA and lateral upright chest series.  FINDINGS: Mild infiltrate is present in the posterior right lung base. Remaining portions of both lungs are clear. No dense airspace consolidation or visible pleural effusion. Heart size and pulmonary vascularity are within normal limits.      Right lower lobe infiltrate.  This report was finalized on 11/7/2017 11:24 AM by Dr. Mick Jensen MD.           I have reviewed her chest x-ray and there is a subtle right lower lobe infiltrate    Assessment/Plan     1. Coronavirus infection treatment symptomatic  2. Pneumonia right lower lobe clinical exam matches chest radiograph.  I think it's very likely that she has a secondary bacterial infection on top of her viral illness.  I agree with the choice of antibiotics azithromycin and Rocephin pending culture results.  3. Acute hypoxic respiratory failure secondary to #1 and 2 above her oxygenation is improving wean O2 off as tolerated.  Patient is not wheezing at this time she has no history of obstructive lung disease no real risk factors I would discontinue steroids  4. Mildly elevated proBNP with normal renal function she doesn't look or sound to be in congestive failure at this time I would just be cautious with any fluids.  5. Lactic acidosis secondary to numbers 1 and  2 and 3 improving suspect she would be normal if we rechecked it but I don't feel it's necessary given her clinical improvement.  6. Pancytopenia we don't have any baseline her white count certainly could be low with a viral syndrome that really doesn't explain the anemia.  Thrombocytopenia also difficult to explain she doesn't look like she should have DIC etc. at this point I recommend just following up these labs.  7. Sepsis  8. Hypertension continue home meds  9. Dyslipidemia on home meds  10. Diabetes mellitus type 2 on oral agents      Ceasar Bradley MD  11/08/17  6:34 AM    Time:

## 2017-11-09 LAB
ANION GAP SERPL CALCULATED.3IONS-SCNC: 12.8 MMOL/L
BUN BLD-MCNC: 20 MG/DL (ref 8–23)
BUN/CREAT SERPL: 19.6 (ref 7–25)
CALCIUM SPEC-SCNC: 8.6 MG/DL (ref 8.8–10.5)
CHLORIDE SERPL-SCNC: 102 MMOL/L (ref 98–107)
CO2 SERPL-SCNC: 26.2 MMOL/L (ref 22–29)
CREAT BLD-MCNC: 1.02 MG/DL (ref 0.57–1)
DEPRECATED RDW RBC AUTO: 43.8 FL (ref 37–54)
ERYTHROCYTE [DISTWIDTH] IN BLOOD BY AUTOMATED COUNT: 12.4 % (ref 11.5–14.5)
GFR SERPL CREATININE-BSD FRML MDRD: 52 ML/MIN/1.73
GLUCOSE BLD-MCNC: 142 MG/DL (ref 65–99)
GLUCOSE BLDC GLUCOMTR-MCNC: 109 MG/DL (ref 70–130)
GLUCOSE BLDC GLUCOMTR-MCNC: 116 MG/DL (ref 70–130)
GLUCOSE BLDC GLUCOMTR-MCNC: 124 MG/DL (ref 70–130)
GLUCOSE BLDC GLUCOMTR-MCNC: 98 MG/DL (ref 70–130)
HCT VFR BLD AUTO: 33.6 % (ref 37–47)
HGB BLD-MCNC: 11.1 G/DL (ref 12–16)
MCH RBC QN AUTO: 31.8 PG (ref 27–31)
MCHC RBC AUTO-ENTMCNC: 33 G/DL (ref 31–37)
MCV RBC AUTO: 96.3 FL (ref 81–99)
PLATELET # BLD AUTO: 152 10*3/MM3 (ref 140–500)
PMV BLD AUTO: 12.2 FL (ref 7.4–10.4)
POTASSIUM BLD-SCNC: 3.6 MMOL/L (ref 3.5–5.2)
RBC # BLD AUTO: 3.49 10*6/MM3 (ref 4.2–5.4)
SODIUM BLD-SCNC: 141 MMOL/L (ref 136–145)
WBC NRBC COR # BLD: 7.83 10*3/MM3 (ref 4.8–10.8)

## 2017-11-09 PROCEDURE — 82962 GLUCOSE BLOOD TEST: CPT

## 2017-11-09 PROCEDURE — 25010000002 ENOXAPARIN PER 10 MG: Performed by: INTERNAL MEDICINE

## 2017-11-09 PROCEDURE — 94799 UNLISTED PULMONARY SVC/PX: CPT

## 2017-11-09 PROCEDURE — 80048 BASIC METABOLIC PNL TOTAL CA: CPT | Performed by: FAMILY MEDICINE

## 2017-11-09 PROCEDURE — 85027 COMPLETE CBC AUTOMATED: CPT | Performed by: FAMILY MEDICINE

## 2017-11-09 PROCEDURE — 25010000002 CEFTRIAXONE PER 250 MG: Performed by: INTERNAL MEDICINE

## 2017-11-09 PROCEDURE — 25010000002 AZITHROMYCIN: Performed by: INTERNAL MEDICINE

## 2017-11-09 RX ADMIN — ENOXAPARIN SODIUM 40 MG: 40 INJECTION SUBCUTANEOUS at 16:52

## 2017-11-09 RX ADMIN — DULOXETINE HYDROCHLORIDE 30 MG: 30 CAPSULE, DELAYED RELEASE ORAL at 08:49

## 2017-11-09 RX ADMIN — PANTOPRAZOLE SODIUM 40 MG: 40 TABLET, DELAYED RELEASE ORAL at 06:27

## 2017-11-09 RX ADMIN — ATENOLOL 50 MG: 50 TABLET ORAL at 08:49

## 2017-11-09 RX ADMIN — LISINOPRIL 20 MG: 20 TABLET ORAL at 08:49

## 2017-11-09 RX ADMIN — ASPIRIN 81 MG 81 MG: 81 TABLET ORAL at 08:49

## 2017-11-09 RX ADMIN — IPRATROPIUM BROMIDE AND ALBUTEROL SULFATE 3 ML: .5; 3 SOLUTION RESPIRATORY (INHALATION) at 15:46

## 2017-11-09 RX ADMIN — CEFTRIAXONE 1 G: 1 INJECTION, SOLUTION INTRAVENOUS at 12:23

## 2017-11-09 RX ADMIN — TRAZODONE HYDROCHLORIDE 50 MG: 50 TABLET ORAL at 22:53

## 2017-11-09 RX ADMIN — AZITHROMYCIN 500 MG: 500 INJECTION, POWDER, LYOPHILIZED, FOR SOLUTION INTRAVENOUS at 12:56

## 2017-11-09 RX ADMIN — FLUOXETINE 10 MG: 10 CAPSULE ORAL at 08:49

## 2017-11-09 RX ADMIN — METFORMIN HYDROCHLORIDE 500 MG: 500 TABLET ORAL at 16:52

## 2017-11-09 RX ADMIN — ROSUVASTATIN CALCIUM 5 MG: 5 TABLET, FILM COATED ORAL at 08:49

## 2017-11-09 RX ADMIN — METFORMIN HYDROCHLORIDE 500 MG: 500 TABLET ORAL at 08:49

## 2017-11-09 RX ADMIN — DOCUSATE SODIUM 100 MG: 100 CAPSULE, LIQUID FILLED ORAL at 16:52

## 2017-11-09 RX ADMIN — Medication 10 ML: at 21:02

## 2017-11-09 RX ADMIN — IPRATROPIUM BROMIDE AND ALBUTEROL SULFATE 3 ML: .5; 3 SOLUTION RESPIRATORY (INHALATION) at 11:35

## 2017-11-09 RX ADMIN — TRIAMTERENE AND HYDROCHLOROTHIAZIDE 1 TABLET: 37.5; 25 TABLET ORAL at 08:49

## 2017-11-09 RX ADMIN — DOCUSATE SODIUM 100 MG: 100 CAPSULE, LIQUID FILLED ORAL at 08:49

## 2017-11-09 RX ADMIN — IPRATROPIUM BROMIDE AND ALBUTEROL SULFATE 3 ML: .5; 3 SOLUTION RESPIRATORY (INHALATION) at 19:25

## 2017-11-09 RX ADMIN — IPRATROPIUM BROMIDE AND ALBUTEROL SULFATE 3 ML: .5; 3 SOLUTION RESPIRATORY (INHALATION) at 07:42

## 2017-11-09 NOTE — PROGRESS NOTES
"Daily Progress Note:    Subjective: Resting better.  Feels better still short of breath.  She has been weaned off oxygen maintain sats above 90    Flowsheet Rows         First Filed Value    Admission Height  62\" (157.5 cm) Documented at 11/07/2017 1049    Admission Weight  179 lb 3.2 oz (81.3 kg) Documented at 11/07/2017 1049          Patient Vitals for the past 24 hrs:   BP Temp Temp src Pulse Resp SpO2   11/09/17 0643 139/65 97.9 °F (36.6 °C) Oral 69 18 93 %   11/08/17 2347 116/64 97.9 °F (36.6 °C) Oral 81 18 93 %   11/08/17 2142 - - - 72 18 96 %   11/08/17 1900 131/64 97.8 °F (36.6 °C) Oral 68 18 96 %   11/08/17 1542 - - - 74 16 93 %   11/08/17 1157 - - - - - 94 %   11/08/17 1141 - - - 72 16 98 %   11/08/17 1100 154/72 98 °F (36.7 °C) Oral 73 18 95 %         Intake/Output Summary (Last 24 hours) at 11/09/17 0740  Last data filed at 11/08/17 1300   Gross per 24 hour   Intake              480 ml   Output                0 ml   Net              480 ml       Review of Systems   Constitutional: Positive for fatigue.   Respiratory: Positive for cough and shortness of breath. Negative for chest tightness and wheezing.    Cardiovascular: Negative for chest pain.   Gastrointestinal: Negative for abdominal distention, abdominal pain, diarrhea, nausea and vomiting.   Genitourinary: Negative for frequency.   Skin: Negative for rash.   Psychiatric/Behavioral: Negative for agitation.       Physical Exam   Constitutional: She appears well-developed and well-nourished.   HENT:   Head: Normocephalic.   Mouth/Throat: Oropharynx is clear and moist.   Eyes: Conjunctivae are normal.   Neck: Normal range of motion. No JVD present. No thyromegaly present.   Cardiovascular: Normal rate, regular rhythm and normal heart sounds.    No murmur heard.  Pulmonary/Chest: Effort normal. No respiratory distress. She has wheezes (scattered). She has rales in the right lower field.   Abdominal: Soft. Bowel sounds are normal. She exhibits no " distension. There is no tenderness. There is no guarding.   Neurological: She is alert.   Skin: Skin is warm and dry. No rash noted.   Nursing note and vitals reviewed.          Medication Review:   I have reviewed the patient's current medication list      aspirin 81 mg Oral Daily   atenolol 50 mg Oral Daily   azithromycin 500 mg Intravenous Q24H   ceftriaxone 1 g Intravenous Q24H   docusate sodium 100 mg Oral BID   DULoxetine 30 mg Oral Daily   enoxaparin 40 mg Subcutaneous Q24H   FLUoxetine 10 mg Oral Daily   insulin aspart 0-9 Units Subcutaneous 4x Daily AC & at Bedtime   ipratropium-albuterol 3 mL Nebulization 4x Daily - RT   lisinopril 20 mg Oral Q24H   metFORMIN 500 mg Oral BID With Meals   pantoprazole 40 mg Oral Q AM   rosuvastatin 5 mg Oral Daily   traZODone 50 mg Oral Nightly   triamterene-hydrochlorothiazide 1 tablet Oral Daily                  Labs:    Results from last 7 days  Lab Units 11/09/17  0333 11/08/17  0628 11/07/17  1052   WBC 10*3/mm3 7.83 2.91* 4.00*   HEMOGLOBIN g/dL 11.1* 11.4* 11.7*   HEMATOCRIT % 33.6* 33.6* 34.9*   PLATELETS 10*3/mm3 152 81* 118*       Results from last 7 days  Lab Units 11/09/17  0333 11/08/17  0628 11/07/17  1052   SODIUM mmol/L 141 137 141   POTASSIUM mmol/L 3.6 4.0 3.6   CHLORIDE mmol/L 102 100 102   CO2 mmol/L 26.2 22.6 25.9   BUN mg/dL 20 12 9   CREATININE mg/dL 1.02* 0.73 0.75   CALCIUM mg/dL 8.6* 8.0* 8.6*   BILIRUBIN mg/dL  --  0.2 0.3   ALK PHOS U/L  --  63 68   ALT (SGPT) U/L  --  15 20   AST (SGOT) U/L  --  11 18   GLUCOSE mg/dL 142* 243* 140*           Lab Results (last 24 hours)     Procedure Component Value Units Date/Time    Lactic Acid, Plasma [[id number]]  (Normal) Collected:  11/08/17 0800    Specimen:  Blood Updated:  11/08/17 0828     Lactate 1.8 mmol/L     Blood Culture - Blood, [[id number]]  (Normal) Collected:  11/07/17 1310    Specimen:  Blood from Arm, Left Updated:  11/08/17 1401     Blood Culture No growth at 24 hours    Blood Culture - Blood,  [[id number]]  (Normal) Collected:  11/07/17 1310    Specimen:  Blood from Arm, Right Updated:  11/08/17 1401     Blood Culture No growth at 24 hours    POC Glucose Fingerstick [[id number]]  (Abnormal) Collected:  11/08/17 1700    Specimen:  Blood Updated:  11/08/17 1713     Glucose 188 (H) mg/dL     Narrative:       Meter: [id number] : [id number] Jainya Palma NA CERT.    POC Glucose Fingerstick [[id number]]  (Abnormal) Collected:  11/08/17 2106    Specimen:  Blood Updated:  11/08/17 2130     Glucose 150 (H) mg/dL     Narrative:       Meter: [id number] : [id number] Janiya Palma NA CERT.    CBC (No Diff) [[id number]]  (Abnormal) Collected:  11/09/17 0333    Specimen:  Blood Updated:  11/09/17 0539     WBC 7.83 10*3/mm3      RBC 3.49 (L) 10*6/mm3      Hemoglobin 11.1 (L) g/dL      Hematocrit 33.6 (L) %      MCV 96.3 fL      MCH 31.8 (H) pg      MCHC 33.0 g/dL      RDW 12.4 %      RDW-SD 43.8 fl      MPV 12.2 (H) fL      Platelets 152 10*3/mm3     Basic Metabolic Panel [[id number]]  (Abnormal) Collected:  11/09/17 0333    Specimen:  Blood Updated:  11/09/17 0609     Glucose 142 (H) mg/dL      BUN 20 mg/dL      Creatinine 1.02 (H) mg/dL      Sodium 141 mmol/L      Potassium 3.6 mmol/L      Chloride 102 mmol/L      CO2 26.2 mmol/L      Calcium 8.6 (L) mg/dL      eGFR Non African Amer 52 (L) mL/min/1.73      BUN/Creatinine Ratio 19.6     Anion Gap 12.8 mmol/L     Narrative:       The MDRD GFR formula is only valid for adults with stable renal function between ages 18 and 70.              Radiology:  Imaging Results (last 24 hours)     ** No results found for the last 24 hours. **          Cardiology:  ECG/EMG Results (last 24 hours)     ** No results found for the last 24 hours. **          I have reviewed consult notes.    Assessment and Plan:          1.  Viral infection with parainfluenza virus    2.  Right lower lobe pneumonia can be required likely bacterial will continue Rocephin and Zithromax and  change to by mouth antibiotics in am.  Will increase ambulation likely be discharged tomorrow if patient is to improve    3.  Sepsis resolved    4.  Acute hypoxic respiratory failure resolved patient off oxygen    5.  Adult-onset diabetes mellitus continue Accu-Cheks sliding scale insulin    6.  Hypertension moderately controlled has been labile likely due to anxiety as well as acute viral illness will continue to monitor    7.  Coronary disease nothing acute no change medications    8.  Pancytopenia improving likely was due to viral illness

## 2017-11-09 NOTE — PROGRESS NOTES
LOS: 2 days   Patient Care Team:  Sandra Foley MD as PCP - General  Sandra Foley MD    Subjective     Patient is feeling better coughing is less although she is still having coughing paroxysms.  Cough is nonproductive.  She is better when she gets up but she still feels very weak and not real steady and sure of herself when she is up walking.    Review of Systems:          Objective     Vital Signs  Vital Sign Min/Max for last 24 hours  Temp  Min: 97.8 °F (36.6 °C)  Max: 98.2 °F (36.8 °C)   BP  Min: 116/64  Max: 154/72   Pulse  Min: 64  Max: 81   Resp  Min: 16  Max: 18   SpO2  Min: 93 %  Max: 98 %   Flow (L/min)  Min: 1.5  Max: 3   No Data Recorded        Ventilator/Non-Invasive Ventilation Settings     None                       Body mass index is 32.08 kg/(m^2).  I/O last 3 completed shifts:  In: 2730 [P.O.:480; I.V.:1000; IV Piggyback:1250]  Out: -            Physical Exam:    General Appearance: Well-developed white female resting comfortably in bed on Room air oxygen saturations of about 93-96%.  Eyes: Conjunctiva are clear and anicteric pupils are equal and reactive to light  ENT: Nasal septum nasal mucosa is a little dry oral mucous membranes are moist no erythema or exudates she has a Mallampati type I airway  Neck: No adenopathy or thyromegaly no jugular venous distention trachea midline  Lungs: Very minimal crackles at the right base sort of course crackles no dullness on percussion no wheezing symmetric nonlabored chest expansion  Cardiac: Regular rate and rhythm no murmur or gallop  Abdomen: Soft nontender no palpable organomegaly or masses moderately obese  : Not examined  Musc/Skel: Grossly normal  Skin: No jaundice, no petechiae, no rashes noted  Neuro: She is alert and oriented ×3 she is pleasant and cooperative she is moving all extremities following commands and grossly intact  Extremities/P Vascular: No clubbing cyanosis or edema she has palpable radial and dorsalis pedis  pulses  MSE: Seems to be in reasonably good spirits     Labs:    Results from last 7 days  Lab Units 11/09/17  0333 11/08/17  0628 11/07/17  1052   GLUCOSE mg/dL 142* 243* 140*   SODIUM mmol/L 141 137 141   POTASSIUM mmol/L 3.6 4.0 3.6   CO2 mmol/L 26.2 22.6 25.9   CHLORIDE mmol/L 102 100 102   ANION GAP mmol/L 12.8 14.4 13.1   CREATININE mg/dL 1.02* 0.73 0.75   BUN mg/dL 20 12 9   BUN / CREAT RATIO  19.6 16.4 12.0   CALCIUM mg/dL 8.6* 8.0* 8.6*   EGFR IF NONAFRICN AM mL/min/1.73 52* 77 75   ALK PHOS U/L  --  63 68   TOTAL PROTEIN g/dL  --  6.0 6.6   ALT (SGPT) U/L  --  15 20   AST (SGOT) U/L  --  11 18   BILIRUBIN mg/dL  --  0.2 0.3   ALBUMIN g/dL  --  3.40* 3.80   GLOBULIN gm/dL  --  2.6 2.8   A/G RATIO g/dL  --  1.3 1.4     Estimated Creatinine Clearance: 43.7 mL/min (by C-G formula based on Cr of 1.02).        Results from last 7 days  Lab Units 11/09/17  0333 11/08/17  0628 11/07/17  1052   WBC 10*3/mm3 7.83 2.91* 4.00*   RBC 10*6/mm3 3.49* 3.53* 3.62*   HEMOGLOBIN g/dL 11.1* 11.4* 11.7*   HEMATOCRIT % 33.6* 33.6* 34.9*   MCV fL 96.3 95.2 96.4   MCH pg 31.8* 32.3* 32.3*   MCHC g/dL 33.0 33.9 33.5   RDW % 12.4 12.2 12.5   RDW-SD fl 43.8 42.2 43.9   MPV fL 12.2* 12.0* 11.2*   PLATELETS 10*3/mm3 152 81* 118*   NEUTROPHIL % %  --  86.0* 72.4*   LYMPHOCYTE % %  --  9.6* 8.8*   MONOCYTES % %  --  3.4 15.0*   EOSINOPHIL % %  --  0.0 3.0   BASOPHIL % %  --  0.0 0.5   IMM GRAN % %  --  1.0* 0.3   NEUTROS ABS 10*3/mm3  --  2.50 2.90   LYMPHS ABS 10*3/mm3  --  0.28* 0.35*   MONOS ABS 10*3/mm3  --  0.10 0.60   EOS ABS 10*3/mm3  --  0.00* 0.12   BASOS ABS 10*3/mm3  --  0.00 0.02   IMMATURE GRANS (ABS) 10*3/mm3  --  0.03 0.01   NRBC /100 WBC  --  0.0 0.0           Results from last 7 days  Lab Units 11/08/17  0629 11/08/17  0628 11/07/17  1823   TROPONIN T ng/mL <0.010 <0.010 <0.010       Results from last 7 days  Lab Units 11/07/17  1052   PROBNP pg/mL 1446.0*           Results from last 7 days  Lab Units 11/08/17  0800  11/07/17  1850 11/07/17  1310 11/07/17  1237   LACTATE mmol/L 1.8 2.3*  --  2.5*   PROCALCITONIN ng/mL  --   --  0.04*  --          Microbiology Results (last 10 days)     Procedure Component Value - Date/Time    Respiratory Panel, PCR - Swab, Nasopharynx [[id number]]  (Abnormal) Collected:  11/07/17 1436    Lab Status:  Final result Specimen:  Swab from Nasopharynx Updated:  11/07/17 2310     ADENOVIRUS, PCR Not Detected     Coronavirus 229E Not Detected     Coronavirus HKU1 Not Detected     Coronavirus NL63 Not Detected     Coronavirus OC43 Not Detected     Human Metapneumovirus Not Detected     Human Rhinovirus/Enterovirus Not Detected     Influenza B PCR Not Detected     Parainfluenza Virus 1 Not Detected     Parainfluenza Virus 2 Not Detected     Parainfluenza Virus 3 Not Detected     Parainfluenza Virus 4 Detected (A)     Bordetella pertussis pcr Not Detected     Influenza 2009 H1N1 by PCR Not Detected     Chlamydophila pneumoniae PCR Not Detected     Mycoplasma pneumo by PCR Not Detected     Influenza A PCR Not Detected     Influenza A H3 Not Detected     Influenza A H1 Not Detected     RSV, PCR Not Detected    Influenza Antigen, Rapid - Swab, Nasopharynx [[id number]]  (Normal) Collected:  11/07/17 1436    Lab Status:  Final result Specimen:  Swab from Nasopharynx Updated:  11/07/17 1523     Influenza A Ag, EIA Negative     Influenza B Ag, EIA Negative    Blood Culture - Blood, [[id number]]  (Normal) Collected:  11/07/17 1310    Lab Status:  Preliminary result Specimen:  Blood from Arm, Left Updated:  11/08/17 1401     Blood Culture No growth at 24 hours    Blood Culture - Blood, [[id number]]  (Normal) Collected:  11/07/17 1310    Lab Status:  Preliminary result Specimen:  Blood from Arm, Right Updated:  11/08/17 1401     Blood Culture No growth at 24 hours                aspirin 81 mg Oral Daily   atenolol 50 mg Oral Daily   azithromycin 500 mg Intravenous Q24H   ceftriaxone 1 g Intravenous Q24H   docusate  sodium 100 mg Oral BID   DULoxetine 30 mg Oral Daily   enoxaparin 40 mg Subcutaneous Q24H   FLUoxetine 10 mg Oral Daily   insulin aspart 0-9 Units Subcutaneous 4x Daily AC & at Bedtime   ipratropium-albuterol 3 mL Nebulization 4x Daily - RT   lisinopril 20 mg Oral Q24H   metFORMIN 500 mg Oral BID With Meals   pantoprazole 40 mg Oral Q AM   rosuvastatin 5 mg Oral Daily   traZODone 50 mg Oral Nightly   triamterene-hydrochlorothiazide 1 tablet Oral Daily          Diagnostics:  Xr Chest 2 View    Result Date: 11/7/2017  CHEST X-RAY, 11/07/2017:  HISTORY: 77-year-old female in the ED complaining of one history of shortness of air, wheezing and productive cough.  TECHNIQUE: PA and lateral upright chest series.  FINDINGS: Mild infiltrate is present in the posterior right lung base. Remaining portions of both lungs are clear. No dense airspace consolidation or visible pleural effusion. Heart size and pulmonary vascularity are within normal limits.      Right lower lobe infiltrate.  This report was finalized on 11/7/2017 11:24 AM by Dr. Mick Jensen MD.               Active Hospital Problems (** Indicates Principal Problem)    Diagnosis Date Noted   • Pneumonia of right lower lobe due to infectious organism [J18.1] 11/07/2017      Resolved Hospital Problems    Diagnosis Date Noted Date Resolved   No resolved problems to display.         Assessment/Plan     1. Coronavirus infection treatment symptomatic  2. Pneumonia right lower lobe clinical exam matches chest radiograph.  I think it's very likely that she has a secondary bacterial infection on top of her viral illness.  I agree with the choice of antibiotics azithromycin and Rocephin pending culture results.Probably would complete 5 days of azithromycin and 7 days of cephalosporin.  3. Acute hypoxic respiratory failure secondary to #1 and 2 above Resolved oxygenating well on room air  4. Mildly elevated proBNP with normal renal function she doesn't look or sound to be  in congestive failure at this time I would just be cautious with any fluids.  5. Lactic acidosis resolved.  6. Pancytopenia white count and platelets improved I suspect there were just decreased secondary to viral illness mild anemia persist  7. Sepsis  8. Hypertension continue home meds  9. Dyslipidemia on home meds  10. Diabetes mellitus type 2 on oral agents    Plan for disposition: Patient is looking better I suspect if she continues this progress she can go home tomorrow to complete her antibiotic course.  If she continues to improve and felt a little better on her feet getting up and getting around she might even be up to go home later today .  She should have follow-up chest x-ray in 4-6 weeks to ensure complete clearing    Ceasar Bradley MD  11/09/17  6:26 AM    Time:

## 2017-11-10 ENCOUNTER — APPOINTMENT (OUTPATIENT)
Dept: GENERAL RADIOLOGY | Facility: HOSPITAL | Age: 77
End: 2017-11-10

## 2017-11-10 VITALS
SYSTOLIC BLOOD PRESSURE: 145 MMHG | TEMPERATURE: 97.9 F | HEART RATE: 71 BPM | WEIGHT: 175.4 LBS | OXYGEN SATURATION: 91 % | RESPIRATION RATE: 16 BRPM | HEIGHT: 62 IN | BODY MASS INDEX: 32.28 KG/M2 | DIASTOLIC BLOOD PRESSURE: 80 MMHG

## 2017-11-10 LAB
GLUCOSE BLDC GLUCOMTR-MCNC: 102 MG/DL (ref 70–130)
GLUCOSE BLDC GLUCOMTR-MCNC: 118 MG/DL (ref 70–130)

## 2017-11-10 PROCEDURE — 94799 UNLISTED PULMONARY SVC/PX: CPT

## 2017-11-10 PROCEDURE — 82962 GLUCOSE BLOOD TEST: CPT

## 2017-11-10 PROCEDURE — 71020 HC CHEST PA AND LATERAL: CPT

## 2017-11-10 RX ORDER — CEFDINIR 300 MG/1
300 CAPSULE ORAL EVERY 12 HOURS SCHEDULED
Qty: 8 CAPSULE | Refills: 0 | Status: SHIPPED | OUTPATIENT
Start: 2017-11-10 | End: 2017-11-14

## 2017-11-10 RX ORDER — AZITHROMYCIN 250 MG/1
500 TABLET, FILM COATED ORAL
Status: DISCONTINUED | OUTPATIENT
Start: 2017-11-10 | End: 2017-11-10 | Stop reason: HOSPADM

## 2017-11-10 RX ORDER — LISINOPRIL 20 MG/1
20 TABLET ORAL
Qty: 30 TABLET | Refills: 0 | Status: SHIPPED | OUTPATIENT
Start: 2017-11-10

## 2017-11-10 RX ORDER — AZITHROMYCIN 500 MG/1
500 TABLET, FILM COATED ORAL DAILY
Qty: 5 TABLET | Refills: 0 | Status: SHIPPED | OUTPATIENT
Start: 2017-11-10 | End: 2017-11-15

## 2017-11-10 RX ORDER — SACCHAROMYCES BOULARDII 250 MG
250 CAPSULE ORAL 2 TIMES DAILY
Qty: 30 CAPSULE | Refills: 0 | Status: SHIPPED | OUTPATIENT
Start: 2017-11-10

## 2017-11-10 RX ORDER — CEFDINIR 300 MG/1
300 CAPSULE ORAL EVERY 12 HOURS SCHEDULED
Status: DISCONTINUED | OUTPATIENT
Start: 2017-11-10 | End: 2017-11-10 | Stop reason: HOSPADM

## 2017-11-10 RX ADMIN — CEFDINIR 300 MG: 300 CAPSULE ORAL at 09:45

## 2017-11-10 RX ADMIN — ACETAMINOPHEN 650 MG: 325 TABLET, FILM COATED ORAL at 08:12

## 2017-11-10 RX ADMIN — PANTOPRAZOLE SODIUM 40 MG: 40 TABLET, DELAYED RELEASE ORAL at 05:36

## 2017-11-10 RX ADMIN — ASPIRIN 81 MG 81 MG: 81 TABLET ORAL at 08:13

## 2017-11-10 RX ADMIN — ROSUVASTATIN CALCIUM 5 MG: 5 TABLET, FILM COATED ORAL at 08:13

## 2017-11-10 RX ADMIN — IPRATROPIUM BROMIDE AND ALBUTEROL SULFATE 3 ML: .5; 3 SOLUTION RESPIRATORY (INHALATION) at 07:29

## 2017-11-10 RX ADMIN — LISINOPRIL 20 MG: 20 TABLET ORAL at 08:13

## 2017-11-10 RX ADMIN — DOCUSATE SODIUM 100 MG: 100 CAPSULE, LIQUID FILLED ORAL at 08:13

## 2017-11-10 RX ADMIN — FLUOXETINE 10 MG: 10 CAPSULE ORAL at 08:13

## 2017-11-10 RX ADMIN — DULOXETINE HYDROCHLORIDE 30 MG: 30 CAPSULE, DELAYED RELEASE ORAL at 08:13

## 2017-11-10 RX ADMIN — IPRATROPIUM BROMIDE AND ALBUTEROL SULFATE 3 ML: .5; 3 SOLUTION RESPIRATORY (INHALATION) at 11:20

## 2017-11-10 RX ADMIN — IPRATROPIUM BROMIDE AND ALBUTEROL SULFATE 3 ML: .5; 3 SOLUTION RESPIRATORY (INHALATION) at 02:22

## 2017-11-10 RX ADMIN — METFORMIN HYDROCHLORIDE 500 MG: 500 TABLET ORAL at 08:13

## 2017-11-10 RX ADMIN — TRIAMTERENE AND HYDROCHLOROTHIAZIDE 1 TABLET: 37.5; 25 TABLET ORAL at 08:13

## 2017-11-10 RX ADMIN — ATENOLOL 50 MG: 50 TABLET ORAL at 08:13

## 2017-11-10 RX ADMIN — AZITHROMYCIN 500 MG: 250 TABLET, FILM COATED ORAL at 09:45

## 2017-11-10 NOTE — DISCHARGE SUMMARY
Palma Paredes  1940  [id number]        Discharge Summary    Date of Admission: 11/7/2017  Date of Discharge:  11/10/2017    Primary Discharge Diagnoses: Viral bronchitis from parainfluenza virus      Secondary Discharge Diagnoses:     Superimposed right lower lobe pneumonia likely bacterial  Sepsis secondary to pneumonia  Acute hypoxic respiratory failure resolved  Pancytopenia secondary to viral illness resolved  Adult-onset diabetes mellitus well controlled  Depression well controlled  Hypertension  Dyslipidemia    PCP  Patient Care Team:  Sandra Foley MD as PCP - General  Sandra Foley MD    Consults:   Consults     Date and Time Order Name Status Description    11/7/2017 1554 Inpatient Consult to Pulmonology              History of Present Illness:  77-year-old white female presented emergency room with acute onset of cough and chest congestion.  Patient says she was doing pretty well in her normal state of health until the night prior to admission.  Started feeling poorly a little bit of congestion apparently her daughter noted her voice to be a little raspy.  The time she awoke the next morning she was feeling very badly short of breath with cough not producing any sputum.  No chest pain or pressure just the shortness of breath.  No nausea vomiting or diarrhea minimal runny nose no sore throat.       Hospital Course  Patient admitted with a diagnosis of right lower lobe pneumonia and sepsis.  Per sepsis protocol she was aggressively hydrated place on telemetry repeat lactic acid came back as normal.  She has peripheral blood cultures done and was started empirically on IV Zithromax and IV Rocephin.  Pulmonology saw patient consultation and concurred with our management.  Respiratory viral problems come back with parainfluenza virus and she'll try to viral bronchitis with superimposed right lower lobe bacterial pneumonia.    Treatment interventions patient improved she was able to be weaned off her  oxygen and maintaining her sats above 90% on room air.  Shortness of breath improved cough and congestion for which resolved chest x-ray done prior to discharge showed partial resolution of right lower lobe infiltrate she was switched over from IV supervisor Rocephin to by mouth Omnicef and azithromycin for which she completed a 5 day course.    With improvement of his pancytopenia also improved.  She should be followed by her primary care physician within 5-7 days of discharge      Operations and Procedures Performed:       Xr Chest 2 View    Result Date: 11/10/2017  Narrative: CHEST X-RAY, 11/10/2017:  HISTORY: 77-year-old female hospital inpatient with pneumonia. Cough and congestion.  TECHNIQUE: AP portable upright chest x-ray.  FINDINGS: Mild right basilar infiltrate appears improved since 11/07/2017. Probable tiny right pleural effusion. Lungs are otherwise clear. Heart size and pulmonary vascularity are within normal limits.      Impression: 1. Right lower lobe infiltrate appears improved since 11/07/2017. 2. Probable tiny right pleural effusion.  This report was finalized on 11/10/2017 8:23 AM by Dr. Mick Jensen MD.      Xr Chest 2 View    Result Date: 11/7/2017  Narrative: CHEST X-RAY, 11/07/2017:  HISTORY: 77-year-old female in the ED complaining of one history of shortness of air, wheezing and productive cough.  TECHNIQUE: PA and lateral upright chest series.  FINDINGS: Mild infiltrate is present in the posterior right lung base. Remaining portions of both lungs are clear. No dense airspace consolidation or visible pleural effusion. Heart size and pulmonary vascularity are within normal limits.      Impression: Right lower lobe infiltrate.  This report was finalized on 11/7/2017 11:24 AM by Dr. Mick Jensen MD.        Last Lab Results:     Results from last 7 days  Lab Units 11/09/17  0333 11/08/17  0628 11/07/17  1052   WBC 10*3/mm3 7.83 2.91* 4.00*   HEMOGLOBIN g/dL 11.1* 11.4* 11.7*    HEMATOCRIT % 33.6* 33.6* 34.9*   PLATELETS 10*3/mm3 152 81* 118*       Results from last 7 days  Lab Units 11/09/17  0333 11/08/17  0628 11/07/17  1052   SODIUM mmol/L 141 137 141   POTASSIUM mmol/L 3.6 4.0 3.6   CHLORIDE mmol/L 102 100 102   CO2 mmol/L 26.2 22.6 25.9   BUN mg/dL 20 12 9   CREATININE mg/dL 1.02* 0.73 0.75   CALCIUM mg/dL 8.6* 8.0* 8.6*   BILIRUBIN mg/dL  --  0.2 0.3   ALK PHOS U/L  --  63 68   ALT (SGPT) U/L  --  15 20   AST (SGOT) U/L  --  11 18   GLUCOSE mg/dL 142* 243* 140*           Lab Results (last 24 hours)     Procedure Component Value Units Date/Time    Blood Culture - Blood, [[id number]]  (Normal) Collected:  11/07/17 1310    Specimen:  Blood from Arm, Left Updated:  11/09/17 1401     Blood Culture No growth at 2 days    Blood Culture - Blood, [[id number]]  (Normal) Collected:  11/07/17 1310    Specimen:  Blood from Arm, Right Updated:  11/09/17 1401     Blood Culture No growth at 2 days    POC Glucose Fingerstick [[id number]]  (Normal) Collected:  11/09/17 1625    Specimen:  Blood Updated:  11/09/17 1634     Glucose 109 mg/dL     Narrative:       Meter: [id number] : [id number] Tung Delgado NURSING ASSISTANT    POC Glucose Fingerstick [[id number]]  (Normal) Collected:  11/09/17 2119    Specimen:  Blood Updated:  11/09/17 2141     Glucose 98 mg/dL     Narrative:       Meter: [id number] : [id number] Gasper Valdivia RN    POC Glucose Fingerstick [[id number]]  (Normal) Collected:  11/10/17 0812    Specimen:  Blood Updated:  11/10/17 0818     Glucose 118 mg/dL     Narrative:       Meter: [id number] : [id number] Isela Mancilla Nursing Assistant    POC Glucose Fingerstick [[id number]]  (Normal) Collected:  11/10/17 1159    Specimen:  Blood Updated:  11/10/17 1205     Glucose 102 mg/dL     Narrative:       Meter: [id number] : [id number] Isela Mancilla Nursing Assistant              PROCEDURES          Allergies:  has No Known Allergies.    Chiki  reviewed    Discharge Medications:    Palma Paredes   Home Medication Instructions ROWDY:[id number]    Printed on:11/10/17 1228   Medication Information                      aspirin 81 MG tablet  Take 81 mg by mouth Daily.             atenolol (TENORMIN) 50 MG tablet  Take 50 mg by mouth Daily.             azithromycin (ZITHROMAX) 500 MG tablet  Take 1 tablet by mouth Daily for 5 days.             cefdinir (OMNICEF) 300 MG capsule  Take 1 capsule by mouth Every 12 (Twelve) Hours for 8 doses. Indications: Pneumonia             DULoxetine (CYMBALTA) 30 MG capsule  Take 30 mg by mouth Daily.             FLUoxetine (PROzac) 10 MG capsule  Take 10 mg by mouth Daily.             lisinopril (PRINIVIL,ZESTRIL) 20 MG tablet  Take 1 tablet by mouth Daily.             metFORMIN (GLUCOPHAGE) 1000 MG tablet  Take 1,000 mg by mouth Daily With Breakfast.             Potassium 99 MG tablet  Take  by mouth Daily.             rosuvastatin (CRESTOR) 20 MG tablet  Take 5 mg by mouth Daily.             spironolactone-hydrochlorothiazide (ALDACTAZIDE) 25-25 MG tablet  Take  by mouth Daily.             traZODone (DESYREL) 50 MG tablet  Take 25 mg by mouth Every Night.             VENTOLIN  (90 BASE) MCG/ACT inhaler  2 puffs Every 4 (Four) Hours As Needed for Wheezing.             vitamin D (ERGOCALCIFEROL) 76772 UNITS capsule capsule  Take 50,000 Units by mouth Every 7 (Seven) Days.             zolpidem (AMBIEN) 10 MG tablet  Take 10 mg by mouth Daily.                   Condition on Discharge:  home    Discharge Disposition  stable    Visiting Nurse:    No     Home PT/OT:  No     Home Safety Evaluation:  No     DME  none    Discharge Diet:           Dietary Orders            Start     Ordered    11/08/17 1239  Diet Regular; Cardiac, Consistent Carbohydrate  Diet Effective Now     Question Answer Comment   Diet Texture / Consistency Regular    Common Modifiers Cardiac    Common Modifiers Consistent Carbohydrate        11/08/17 1239          Activity at Discharge:  As  tolerated      Follow-up Appointments:  With my office as instructed    Test Results Pending at Discharge   Order Current Status    Blood Culture - Blood, Preliminary result    Blood Culture - Blood, Preliminary result           Ana Gamino MD  11/10/17  12:28 PM

## 2017-11-10 NOTE — PLAN OF CARE
Problem: Infection, Risk/Actual (Adult)  Goal: Infection Prevention/Resolution  Outcome: Ongoing (interventions implemented as appropriate)    11/10/17 0246   Infection, Risk/Actual (Adult)   Infection Prevention/Resolution making progress toward outcome         Problem: Patient Care Overview (Adult)  Goal: Plan of Care Review  Outcome: Ongoing (interventions implemented as appropriate)    11/10/17 0246   Coping/Psychosocial Response Interventions   Plan Of Care Reviewed With patient   Patient Care Overview   Progress progress toward functional goals as expected       Goal: Adult Individualization and Mutuality  Outcome: Ongoing (interventions implemented as appropriate)  Goal: Discharge Needs Assessment  Outcome: Ongoing (interventions implemented as appropriate)    Problem: Pneumonia (Adult)  Goal: Signs and Symptoms of Listed Potential Problems Will be Absent or Manageable (Pneumonia)  Outcome: Ongoing (interventions implemented as appropriate)    11/10/17 0246   Pneumonia   Problems Assessed (Pneumonia) all   Problems Present (Pneumonia) none

## 2017-11-10 NOTE — PROGRESS NOTES
"Daily Progress Note:    Subjective: Resting better.  Feels better still short of breath.  She has been weaned off oxygen maintain sats above 90    Flowsheet Rows         First Filed Value    Admission Height  62\" (157.5 cm) Documented at 11/07/2017 1049    Admission Weight  179 lb 3.2 oz (81.3 kg) Documented at 11/07/2017 1049          Patient Vitals for the past 24 hrs:   BP Temp Temp src Pulse Resp SpO2   11/10/17 0638 142/77 97.5 °F (36.4 °C) Oral 74 20 91 %   11/10/17 0329 141/80 98.6 °F (37 °C) Oral 76 18 94 %   11/10/17 0222 - - - 73 26 94 %   11/10/17 0011 145/69 99 °F (37.2 °C) Oral 82 18 94 %   11/09/17 2105 162/79 99.7 °F (37.6 °C) Oral 85 17 94 %   11/09/17 1925 - - - 77 18 96 %   11/09/17 1546 - - - 68 16 96 %   11/09/17 1533 163/77 98.8 °F (37.1 °C) Oral 79 18 94 %   11/09/17 1135 - - - 72 16 96 %   11/09/17 1114 180/78 97.8 °F (36.6 °C) Oral 71 18 94 %   11/09/17 0849 - - - 83 - -   11/09/17 0742 - - - 64 20 93 %         Intake/Output Summary (Last 24 hours) at 11/10/17 0729  Last data filed at 11/10/17 0537   Gross per 24 hour   Intake             1440 ml   Output              800 ml   Net              640 ml       Review of Systems   Constitutional: Positive for fatigue.   Respiratory: Positive for cough and shortness of breath. Negative for chest tightness and wheezing.    Cardiovascular: Negative for chest pain.   Gastrointestinal: Negative for abdominal distention, abdominal pain, diarrhea, nausea and vomiting.   Genitourinary: Negative for frequency.   Skin: Negative for rash.   Psychiatric/Behavioral: Negative for agitation.       Physical Exam   Constitutional: She appears well-developed and well-nourished.   HENT:   Head: Normocephalic.   Mouth/Throat: Oropharynx is clear and moist.   Eyes: Conjunctivae are normal.   Neck: Normal range of motion. No JVD present. No thyromegaly present.   Cardiovascular: Normal rate, regular rhythm and normal heart sounds.    No murmur heard.  Pulmonary/Chest: " Effort normal. No respiratory distress. She has wheezes (scattered). She has rales in the right lower field.   Abdominal: Soft. Bowel sounds are normal. She exhibits no distension. There is no tenderness. There is no guarding.   Neurological: She is alert.   Skin: Skin is warm and dry. No rash noted.   Nursing note and vitals reviewed.          Medication Review:   I have reviewed the patient's current medication list      aspirin 81 mg Oral Daily   atenolol 50 mg Oral Daily   azithromycin 500 mg Oral Q24H   cefdinir 300 mg Oral Q12H   docusate sodium 100 mg Oral BID   DULoxetine 30 mg Oral Daily   enoxaparin 40 mg Subcutaneous Q24H   FLUoxetine 10 mg Oral Daily   insulin aspart 0-9 Units Subcutaneous 4x Daily AC & at Bedtime   ipratropium-albuterol 3 mL Nebulization 4x Daily - RT   lisinopril 20 mg Oral Q24H   metFORMIN 500 mg Oral BID With Meals   pantoprazole 40 mg Oral Q AM   rosuvastatin 5 mg Oral Daily   traZODone 50 mg Oral Nightly   triamterene-hydrochlorothiazide 1 tablet Oral Daily                  Labs:    Results from last 7 days  Lab Units 11/09/17  0333 11/08/17  0628 11/07/17  1052   WBC 10*3/mm3 7.83 2.91* 4.00*   HEMOGLOBIN g/dL 11.1* 11.4* 11.7*   HEMATOCRIT % 33.6* 33.6* 34.9*   PLATELETS 10*3/mm3 152 81* 118*       Results from last 7 days  Lab Units 11/09/17  0333 11/08/17  0628 11/07/17  1052   SODIUM mmol/L 141 137 141   POTASSIUM mmol/L 3.6 4.0 3.6   CHLORIDE mmol/L 102 100 102   CO2 mmol/L 26.2 22.6 25.9   BUN mg/dL 20 12 9   CREATININE mg/dL 1.02* 0.73 0.75   CALCIUM mg/dL 8.6* 8.0* 8.6*   BILIRUBIN mg/dL  --  0.2 0.3   ALK PHOS U/L  --  63 68   ALT (SGPT) U/L  --  15 20   AST (SGOT) U/L  --  11 18   GLUCOSE mg/dL 142* 243* 140*           Lab Results (last 24 hours)     Procedure Component Value Units Date/Time    POC Glucose Fingerstick [[id number]]  (Normal) Collected:  11/09/17 0843    Specimen:  Blood Updated:  11/09/17 0849     Glucose 124 mg/dL     Narrative:       Meter: [id number]  : [id number] Sandip Leigh Staff RN Validator    POC Glucose Fingerstick [[id number]]  (Normal) Collected:  11/09/17 1121    Specimen:  Blood Updated:  11/09/17 1134     Glucose 116 mg/dL     Narrative:       Meter: [id number] : [id number] Tung Delgado NURSING ASSISTANT    Blood Culture - Blood, [[id number]]  (Normal) Collected:  11/07/17 1310    Specimen:  Blood from Arm, Left Updated:  11/09/17 1401     Blood Culture No growth at 2 days    Blood Culture - Blood, [[id number]]  (Normal) Collected:  11/07/17 1310    Specimen:  Blood from Arm, Right Updated:  11/09/17 1401     Blood Culture No growth at 2 days    POC Glucose Fingerstick [[id number]]  (Normal) Collected:  11/09/17 1625    Specimen:  Blood Updated:  11/09/17 1634     Glucose 109 mg/dL     Narrative:       Meter: [id number] : [id number] Tung Delgado NURSING ASSISTANT    POC Glucose Fingerstick [[id number]]  (Normal) Collected:  11/09/17 2119    Specimen:  Blood Updated:  11/09/17 2141     Glucose 98 mg/dL     Narrative:       Meter: [id number] : [id number] Gasper Valdivia RN              Radiology:  Imaging Results (last 24 hours)     ** No results found for the last 24 hours. **          Cardiology:  ECG/EMG Results (last 24 hours)     ** No results found for the last 24 hours. **          I have reviewed consult notes.    Assessment and Plan:          1.  Viral infection with parainfluenza virus    2.  Right lower lobe pneumonia can be required likely bacterial will Change to by mouth antibiotics today.  Patient likely be discharged later this afternoon    3.  Sepsis resolved    4.  Acute hypoxic respiratory failure resolved patient off oxygen    5.  Adult-onset diabetes mellitus continue Accu-Cheks sliding scale insulin    6.  Hypertension moderately controlled has been labile likely due to anxiety as well as acute viral illness will continue to monitor    7.  Coronary disease nothing acute no change medications    8.  Pancytopenia improving  likely was due to viral illness

## 2017-11-10 NOTE — PLAN OF CARE
Problem: Patient Care Overview (Adult)  Goal: Plan of Care Review  Outcome: Ongoing (interventions implemented as appropriate)    11/10/17 0223   Coping/Psychosocial Response Interventions   Plan Of Care Reviewed With patient   Patient Care Overview   Progress improving       Goal: Adult Individualization and Mutuality  Outcome: Ongoing (interventions implemented as appropriate)    Problem: Pneumonia (Adult)  Intervention: Maximize Oxygenation/Ventilation/Perfusion    11/10/17 0223   Activity   Activity Type activity adjusted per tolerance   Promote Aggressive Pulmonary Hygiene/Secretion Management   Cough And Deep Breathing done independently per patient   Respiratory Interventions   Airway/Ventilation Management airway patency maintained;pulmonary hygiene promoted   Incentive Spirometer   Administration (Incentive Spirometer) done independently per patient   Positioning   Head Of Bed (HOB) Position HOB elevated

## 2017-11-10 NOTE — NURSING NOTE
Continued Stay Note  COLLEEN Fitzgerald     Patient Name: Palma Paredes  MRN: [id number]  Today's Date: 11/10/2017    Admit Date: 11/7/2017          Discharge Plan       11/10/17 0851    Case Management/Social Work Plan    Plan home     Additional Comments spoke with patient at bedside. updated IMM. discussed EMS program with Lawanda Samson and patient is agreeable. patient states she may go home later today. will continue to follow.               Discharge Codes     None            Essence Dent RN

## 2017-11-10 NOTE — NURSING NOTE
Continued Stay Note  COLLEEN Fitzgerald     Patient Name: Palma Paredes  MRN: [id number]  Today's Date: 11/10/2017    Admit Date: 11/7/2017          Discharge Plan       11/10/17 1246    Case Management/Social Work Plan    Plan home today    Additional Comments received call from Lawanda Samson, patient has declined EMS program. will continue to follow through discharge.               Discharge Codes     None        Expected Discharge Date and Time     Expected Discharge Date Expected Discharge Time    Nov 10, 2017             Essence Dent RN

## 2017-11-10 NOTE — PROGRESS NOTES
"Adult Nutrition  Assessment/PES    Patient Name:  Palma Paredes  YOB: 1940  MRN: [id number]  Admit Date:  11/7/2017    Assessment Date:  11/10/2017              Reason for Assessment       11/10/17 1311    Reason for Assessment    Reason For Assessment/Visit education    list                Education/Evaluation       11/10/17 1311    Education    Education Education offered and refused;Education topics   Pt states takees oral med at home, only elevated here from med. Lost weight yrs ago & it \"went away\".     Education Topics Cardiac diabetic    Monitor/Evaluation    Education Follow-up --   Declined materials or edu.         Electronically signed by:  Kait Lim RD  11/10/17 1:12 PM  "

## 2017-11-10 NOTE — PROGRESS NOTES
LOS: 3 days   Patient Care Team:  Sandra Foley MD as PCP - General  Sandra Foley MD    Subjective     Patient says she doesn't know why she just doesn't feel as well today he is anxious about going home today.  She still coughing not getting much mucus up.  She says she felt short of breath some during the night but her oxygen saturations were always good when they checked.    Review of Systems:          Objective     Vital Signs  Vital Sign Min/Max for last 24 hours  Temp  Min: 97.8 °F (36.6 °C)  Max: 99.7 °F (37.6 °C)   BP  Min: 139/65  Max: 180/78   Pulse  Min: 64  Max: 85   Resp  Min: 16  Max: 26   SpO2  Min: 68 %  Max: 96 %   No Data Recorded   No Data Recorded      I did review there was this recorded 68% saturation yesterday afternoon she didn't have shortness of breath at that time I think that was probably an error in recording.  Because his saturations pre-and post were all in the mid 90s.  I would think patient would've felt symptoms of she was truly that low and she didn't have any yesterday afternoon she actually said she felt pretty good yesterday afternoon it was through the night since she felt a little short of breath  Ventilator/Non-Invasive Ventilation Settings     None                       Body mass index is 32.08 kg/(m^2).  I/O last 3 completed shifts:  In: 1920 [P.O.:1320; IV Piggyback:600]  Out: -   I/O this shift:  In: -   Out: 800 [Urine:800]        Physical Exam:    General Appearance: Well-developed white female resting comfortably in bed on Room air   Eyes: Conjunctiva are clear and anicteric pupils are equal and reactive to light  ENT: Nasal septumMidline, nasal mucosa is a little dry oral mucous membranes are moist no erythema or exudates she has a Mallampati type I airway  Neck: No adenopathy or thyromegaly no jugular venous distention trachea midline  Lungs: No crackles or rales today no dullness on percussion symmetric chest expansion she is able to talk in full  sentences.  She does have some wheezing some inspiratory and expiratory wheezes but they seemed to be laryngeal in origin and with deep panting there is no wheezing in either upper or lower airways.  suggesting this is more of a functional wheeze probably related to vocal cords.  Cardiac: Regular rate and rhythm no murmur or gallop  Abdomen: Soft nontender no palpable organomegaly or masses, moderately obese  : Not examined  Musc/Skel: Grossly normal  Skin: No jaundice, no petechiae, no rashes noted  Neuro: She is alert and oriented ×3 she is pleasant and cooperative she is moving all extremities following commands and grossly intact  Extremities/P Vascular: No clubbing cyanosis or edema she has palpable radial and dorsalis pedis pulses  MSE: Seems to be in reasonably good spirits     Labs:    Results from last 7 days  Lab Units 11/09/17 0333 11/08/17 0628 11/07/17  1052   GLUCOSE mg/dL 142* 243* 140*   SODIUM mmol/L 141 137 141   POTASSIUM mmol/L 3.6 4.0 3.6   CO2 mmol/L 26.2 22.6 25.9   CHLORIDE mmol/L 102 100 102   ANION GAP mmol/L 12.8 14.4 13.1   CREATININE mg/dL 1.02* 0.73 0.75   BUN mg/dL 20 12 9   BUN / CREAT RATIO  19.6 16.4 12.0   CALCIUM mg/dL 8.6* 8.0* 8.6*   EGFR IF NONAFRICN AM mL/min/1.73 52* 77 75   ALK PHOS U/L  --  63 68   TOTAL PROTEIN g/dL  --  6.0 6.6   ALT (SGPT) U/L  --  15 20   AST (SGOT) U/L  --  11 18   BILIRUBIN mg/dL  --  0.2 0.3   ALBUMIN g/dL  --  3.40* 3.80   GLOBULIN gm/dL  --  2.6 2.8   A/G RATIO g/dL  --  1.3 1.4     Estimated Creatinine Clearance: 43.7 mL/min (by C-G formula based on Cr of 1.02).        Results from last 7 days  Lab Units 11/09/17 0333 11/08/17 0628 11/07/17  1052   WBC 10*3/mm3 7.83 2.91* 4.00*   RBC 10*6/mm3 3.49* 3.53* 3.62*   HEMOGLOBIN g/dL 11.1* 11.4* 11.7*   HEMATOCRIT % 33.6* 33.6* 34.9*   MCV fL 96.3 95.2 96.4   MCH pg 31.8* 32.3* 32.3*   MCHC g/dL 33.0 33.9 33.5   RDW % 12.4 12.2 12.5   RDW-SD fl 43.8 42.2 43.9   MPV fL 12.2* 12.0* 11.2*   PLATELETS  10*3/mm3 152 81* 118*   NEUTROPHIL % %  --  86.0* 72.4*   LYMPHOCYTE % %  --  9.6* 8.8*   MONOCYTES % %  --  3.4 15.0*   EOSINOPHIL % %  --  0.0 3.0   BASOPHIL % %  --  0.0 0.5   IMM GRAN % %  --  1.0* 0.3   NEUTROS ABS 10*3/mm3  --  2.50 2.90   LYMPHS ABS 10*3/mm3  --  0.28* 0.35*   MONOS ABS 10*3/mm3  --  0.10 0.60   EOS ABS 10*3/mm3  --  0.00* 0.12   BASOS ABS 10*3/mm3  --  0.00 0.02   IMMATURE GRANS (ABS) 10*3/mm3  --  0.03 0.01   NRBC /100 WBC  --  0.0 0.0           Results from last 7 days  Lab Units 11/08/17  0629 11/08/17  0628 11/07/17  1823   TROPONIN T ng/mL <0.010 <0.010 <0.010       Results from last 7 days  Lab Units 11/07/17  1052   PROBNP pg/mL 1446.0*           Results from last 7 days  Lab Units 11/08/17  0800 11/07/17  1850 11/07/17  1310 11/07/17  1237   LACTATE mmol/L 1.8 2.3*  --  2.5*   PROCALCITONIN ng/mL  --   --  0.04*  --          Microbiology Results (last 10 days)     Procedure Component Value - Date/Time    Respiratory Panel, PCR - Swab, Nasopharynx [[id number]]  (Abnormal) Collected:  11/07/17 1436    Lab Status:  Final result Specimen:  Swab from Nasopharynx Updated:  11/07/17 2310     ADENOVIRUS, PCR Not Detected     Coronavirus 229E Not Detected     Coronavirus HKU1 Not Detected     Coronavirus NL63 Not Detected     Coronavirus OC43 Not Detected     Human Metapneumovirus Not Detected     Human Rhinovirus/Enterovirus Not Detected     Influenza B PCR Not Detected     Parainfluenza Virus 1 Not Detected     Parainfluenza Virus 2 Not Detected     Parainfluenza Virus 3 Not Detected     Parainfluenza Virus 4 Detected (A)     Bordetella pertussis pcr Not Detected     Influenza 2009 H1N1 by PCR Not Detected     Chlamydophila pneumoniae PCR Not Detected     Mycoplasma pneumo by PCR Not Detected     Influenza A PCR Not Detected     Influenza A H3 Not Detected     Influenza A H1 Not Detected     RSV, PCR Not Detected    Influenza Antigen, Rapid - Swab, Nasopharynx [[id number]]  (Normal)  Collected:  11/07/17 1436    Lab Status:  Final result Specimen:  Swab from Nasopharynx Updated:  11/07/17 1523     Influenza A Ag, EIA Negative     Influenza B Ag, EIA Negative    Blood Culture - Blood, [[id number]]  (Normal) Collected:  11/07/17 1310    Lab Status:  Preliminary result Specimen:  Blood from Arm, Left Updated:  11/09/17 1401     Blood Culture No growth at 2 days    Blood Culture - Blood, [[id number]]  (Normal) Collected:  11/07/17 1310    Lab Status:  Preliminary result Specimen:  Blood from Arm, Right Updated:  11/09/17 1401     Blood Culture No growth at 2 days                aspirin 81 mg Oral Daily   atenolol 50 mg Oral Daily   azithromycin 500 mg Intravenous Q24H   ceftriaxone 1 g Intravenous Q24H   docusate sodium 100 mg Oral BID   DULoxetine 30 mg Oral Daily   enoxaparin 40 mg Subcutaneous Q24H   FLUoxetine 10 mg Oral Daily   insulin aspart 0-9 Units Subcutaneous 4x Daily AC & at Bedtime   ipratropium-albuterol 3 mL Nebulization 4x Daily - RT   lisinopril 20 mg Oral Q24H   metFORMIN 500 mg Oral BID With Meals   pantoprazole 40 mg Oral Q AM   rosuvastatin 5 mg Oral Daily   traZODone 50 mg Oral Nightly   triamterene-hydrochlorothiazide 1 tablet Oral Daily          Diagnostics:  Xr Chest 2 View    Result Date: 11/7/2017  CHEST X-RAY, 11/07/2017:  HISTORY: 77-year-old female in the ED complaining of one history of shortness of air, wheezing and productive cough.  TECHNIQUE: PA and lateral upright chest series.  FINDINGS: Mild infiltrate is present in the posterior right lung base. Remaining portions of both lungs are clear. No dense airspace consolidation or visible pleural effusion. Heart size and pulmonary vascularity are within normal limits.      Right lower lobe infiltrate.  This report was finalized on 11/7/2017 11:24 AM by Dr. Mick Jensen MD.               Active Hospital Problems (** Indicates Principal Problem)    Diagnosis Date Noted   • Pneumonia of right lower lobe due to  infectious organism [J18.1] 11/07/2017      Resolved Hospital Problems    Diagnosis Date Noted Date Resolved   No resolved problems to display.         Assessment/Plan     1. Coronavirus infection treatment symptomatic  2. Pneumonia right lower lobe clinical exam matches chest radiograph.  I think it's very likely that she has a secondary bacterial infection on top of her viral illness. Change to by mouth antibiotics today to complete 5 days of Zithromax and 7 days of cephalosporin  3. Acute hypoxic respiratory failure secondary to #1 and 2 above Resolved oxygenating well on room air  4. Mildly elevated proBNP with normal renal function she doesn't look or sound to be in congestive failure at this time I would just be cautious with any fluids.  5. Lactic acidosis resolved.  6. Pancytopenia white count and platelets improved I suspect there were just decreased secondary to viral illness mild anemia persist  7. Sepsis  8. Hypertension continue home meds  9. Dyslipidemia on home meds  10. Diabetes mellitus type 2 on oral agents    Plan for disposition: Okay from a pulmonary standpoint for discharge home and she is likely to have a slow recovery.  She should have a follow-up chest x-ray in about 4-6 weeks to ensure complete clearance of infiltrate.  She might benefit from a nebulizer for short-term when necessary use with DuoNeb until she recovers from this.    Ceasar Bradley MD  11/10/17  5:54 AM    Time:

## 2017-11-12 LAB
BACTERIA SPEC AEROBE CULT: NORMAL
BACTERIA SPEC AEROBE CULT: NORMAL

## 2017-11-15 LAB
ARTERIAL PATENCY WRIST A: ABNORMAL
ATMOSPHERIC PRESS: 741 MMHG
BASE EXCESS BLDA CALC-SCNC: 2.5 MMOL/L (ref 0–2)
BDY SITE: ABNORMAL
HCO3 BLDA-SCNC: 27.3 MMOL/L (ref 20–26)
HGB BLDA-MCNC: 11.1 G/DL (ref 13.5–17.5)
Lab: 1350
Lab: ABNORMAL
MODALITY: ABNORMAL
NOTIFIED BY: ABNORMAL
NOTIFIED WHO: ABNORMAL
PCO2 BLDA: 42 MM HG (ref 35–45)
PH BLDA: 7.42 PH UNITS (ref 7.35–7.45)
PO2 BLDA: 59.4 MM HG (ref 83–108)
SAO2 % BLDCOA: 89.9 % (ref 94–99)

## 2018-01-23 ENCOUNTER — TELEPHONE (OUTPATIENT)
Dept: CARDIOLOGY | Facility: CLINIC | Age: 78
End: 2018-01-23

## 2018-01-23 NOTE — TELEPHONE ENCOUNTER
Pt is calling re: an xray she had done at Dr. Cox's office, he states there is some calcification seen on the xray & she may want to make sure there aren't any in her heart. Asked pt to have the xray faxed to Dr. Shoemaker so he can make the decision. Pt # [phone]. dmk

## 2018-03-07 ENCOUNTER — TRANSCRIBE ORDERS (OUTPATIENT)
Dept: ADMINISTRATIVE | Facility: HOSPITAL | Age: 78
End: 2018-03-07

## 2018-03-07 ENCOUNTER — HOSPITAL ENCOUNTER (OUTPATIENT)
Dept: MRI IMAGING | Facility: HOSPITAL | Age: 78
Discharge: HOME | End: 2018-03-07
Attending: ORTHOPAEDIC SURGERY | Admitting: ORTHOPAEDIC SURGERY

## 2018-03-07 DIAGNOSIS — M25.561 RIGHT KNEE PAIN, UNSPECIFIED CHRONICITY: Primary | ICD-10-CM

## 2018-03-07 DIAGNOSIS — M25.561 RIGHT KNEE PAIN, UNSPECIFIED CHRONICITY: ICD-10-CM

## 2018-03-07 PROCEDURE — 73721 MRI JNT OF LWR EXTRE W/O DYE: CPT

## 2021-04-29 ENCOUNTER — APPOINTMENT (OUTPATIENT)
Dept: CT IMAGING | Facility: HOSPITAL | Age: 81
End: 2021-04-29

## 2021-04-29 ENCOUNTER — APPOINTMENT (OUTPATIENT)
Dept: GENERAL RADIOLOGY | Facility: HOSPITAL | Age: 81
End: 2021-04-29

## 2021-04-29 ENCOUNTER — HOSPITAL ENCOUNTER (EMERGENCY)
Facility: HOSPITAL | Age: 81
Discharge: HOME | End: 2021-04-29
Attending: EMERGENCY MEDICINE | Admitting: EMERGENCY MEDICINE

## 2021-04-29 VITALS
SYSTOLIC BLOOD PRESSURE: 130 MMHG | HEART RATE: 54 BPM | TEMPERATURE: 98 F | DIASTOLIC BLOOD PRESSURE: 59 MMHG | BODY MASS INDEX: 32.09 KG/M2 | OXYGEN SATURATION: 96 % | WEIGHT: 174.4 LBS | RESPIRATION RATE: 18 BRPM | HEIGHT: 62 IN

## 2021-04-29 DIAGNOSIS — M79.602 PAIN AND NUMBNESS OF LEFT UPPER EXTREMITY: Primary | ICD-10-CM

## 2021-04-29 DIAGNOSIS — R20.0 PAIN AND NUMBNESS OF LEFT UPPER EXTREMITY: Primary | ICD-10-CM

## 2021-04-29 LAB
ALBUMIN SERPL-MCNC: 4 G/DL (ref 3.5–5.2)
ALBUMIN/GLOB SERPL: 1.7 G/DL
ALP SERPL-CCNC: 62 U/L (ref 39–117)
ALT SERPL W P-5'-P-CCNC: 9 U/L (ref 1–33)
ANION GAP SERPL CALCULATED.3IONS-SCNC: 8.4 MMOL/L (ref 5–15)
APTT PPP: 34.9 SECONDS (ref 24.3–38.1)
AST SERPL-CCNC: 11 U/L (ref 1–32)
BASOPHILS # BLD AUTO: 0.03 10*3/MM3 (ref 0–0.2)
BASOPHILS NFR BLD AUTO: 0.8 % (ref 0–1.5)
BILIRUB SERPL-MCNC: 0.4 MG/DL (ref 0–1.2)
BUN SERPL-MCNC: 25 MG/DL (ref 8–23)
BUN/CREAT SERPL: 26 (ref 7–25)
CALCIUM SPEC-SCNC: 8.1 MG/DL (ref 8.6–10.5)
CHLORIDE SERPL-SCNC: 103 MMOL/L (ref 98–107)
CO2 SERPL-SCNC: 26.6 MMOL/L (ref 22–29)
CREAT SERPL-MCNC: 0.96 MG/DL (ref 0.57–1)
DEPRECATED RDW RBC AUTO: 43.8 FL (ref 37–54)
EOSINOPHIL # BLD AUTO: 0.07 10*3/MM3 (ref 0–0.4)
EOSINOPHIL NFR BLD AUTO: 1.8 % (ref 0.3–6.2)
ERYTHROCYTE [DISTWIDTH] IN BLOOD BY AUTOMATED COUNT: 12.1 % (ref 12.3–15.4)
GFR SERPL CREATININE-BSD FRML MDRD: 56 ML/MIN/1.73
GLOBULIN UR ELPH-MCNC: 2.4 GM/DL
GLUCOSE SERPL-MCNC: 184 MG/DL (ref 65–99)
HCT VFR BLD AUTO: 36.8 % (ref 34–46.6)
HGB BLD-MCNC: 12.3 G/DL (ref 12–15.9)
IMM GRANULOCYTES # BLD AUTO: 0.06 10*3/MM3 (ref 0–0.05)
IMM GRANULOCYTES NFR BLD AUTO: 1.6 % (ref 0–0.5)
INR PPP: 1.11 (ref 0.9–1.1)
LYMPHOCYTES # BLD AUTO: 1.05 10*3/MM3 (ref 0.7–3.1)
LYMPHOCYTES NFR BLD AUTO: 27.1 % (ref 19.6–45.3)
MCH RBC QN AUTO: 32.9 PG (ref 26.6–33)
MCHC RBC AUTO-ENTMCNC: 33.4 G/DL (ref 31.5–35.7)
MCV RBC AUTO: 98.4 FL (ref 79–97)
MONOCYTES # BLD AUTO: 0.64 10*3/MM3 (ref 0.1–0.9)
MONOCYTES NFR BLD AUTO: 16.5 % (ref 5–12)
NEUTROPHILS NFR BLD AUTO: 2.02 10*3/MM3 (ref 1.7–7)
NEUTROPHILS NFR BLD AUTO: 52.2 % (ref 42.7–76)
NRBC BLD AUTO-RTO: 0 /100 WBC (ref 0–0.2)
PLAT MORPH BLD: NORMAL
PLATELET # BLD AUTO: 113 10*3/MM3 (ref 140–450)
PMV BLD AUTO: 11.9 FL (ref 6–12)
POTASSIUM SERPL-SCNC: 3.7 MMOL/L (ref 3.5–5.2)
PROT SERPL-MCNC: 6.4 G/DL (ref 6–8.5)
PROTHROMBIN TIME: 14 SECONDS (ref 12.1–15)
QT INTERVAL: 455 MS
RBC # BLD AUTO: 3.74 10*6/MM3 (ref 3.77–5.28)
RBC MORPH BLD: NORMAL
SODIUM SERPL-SCNC: 138 MMOL/L (ref 136–145)
TROPONIN T SERPL-MCNC: <0.01 NG/ML (ref 0–0.03)
TROPONIN T SERPL-MCNC: <0.01 NG/ML (ref 0–0.03)
WBC # BLD AUTO: 3.87 10*3/MM3 (ref 3.4–10.8)
WBC MORPH BLD: NORMAL

## 2021-04-29 PROCEDURE — 85730 THROMBOPLASTIN TIME PARTIAL: CPT | Performed by: EMERGENCY MEDICINE

## 2021-04-29 PROCEDURE — 85610 PROTHROMBIN TIME: CPT | Performed by: EMERGENCY MEDICINE

## 2021-04-29 PROCEDURE — 80053 COMPREHEN METABOLIC PANEL: CPT | Performed by: EMERGENCY MEDICINE

## 2021-04-29 PROCEDURE — 93010 ELECTROCARDIOGRAM REPORT: CPT | Performed by: INTERNAL MEDICINE

## 2021-04-29 PROCEDURE — 85025 COMPLETE CBC W/AUTO DIFF WBC: CPT | Performed by: EMERGENCY MEDICINE

## 2021-04-29 PROCEDURE — 84484 ASSAY OF TROPONIN QUANT: CPT | Performed by: EMERGENCY MEDICINE

## 2021-04-29 PROCEDURE — 71046 X-RAY EXAM CHEST 2 VIEWS: CPT

## 2021-04-29 PROCEDURE — 70450 CT HEAD/BRAIN W/O DYE: CPT

## 2021-04-29 PROCEDURE — 99284 EMERGENCY DEPT VISIT MOD MDM: CPT | Performed by: EMERGENCY MEDICINE

## 2021-04-29 PROCEDURE — 93005 ELECTROCARDIOGRAM TRACING: CPT | Performed by: EMERGENCY MEDICINE

## 2021-04-29 PROCEDURE — 85007 BL SMEAR W/DIFF WBC COUNT: CPT | Performed by: EMERGENCY MEDICINE

## 2021-04-29 PROCEDURE — 99284 EMERGENCY DEPT VISIT MOD MDM: CPT

## 2021-04-29 RX ORDER — SODIUM CHLORIDE 0.9 % (FLUSH) 0.9 %
10 SYRINGE (ML) INJECTION AS NEEDED
Status: DISCONTINUED | OUTPATIENT
Start: 2021-04-29 | End: 2021-04-29 | Stop reason: HOSPADM

## 2021-04-30 LAB — QT INTERVAL: 467 MS

## 2022-07-21 ENCOUNTER — TRANSCRIBE ORDERS (OUTPATIENT)
Dept: ADMINISTRATIVE | Facility: HOSPITAL | Age: 82
End: 2022-07-21

## 2022-07-21 DIAGNOSIS — R10.31 CHRONIC BILATERAL LOWER ABDOMINAL PAIN: Primary | ICD-10-CM

## 2022-07-21 DIAGNOSIS — R10.32 CHRONIC BILATERAL LOWER ABDOMINAL PAIN: Primary | ICD-10-CM

## 2022-07-21 DIAGNOSIS — M54.16 LUMBAR RADICULOPATHY: ICD-10-CM

## 2022-07-21 DIAGNOSIS — G89.29 CHRONIC BILATERAL LOWER ABDOMINAL PAIN: Primary | ICD-10-CM

## 2022-08-04 ENCOUNTER — HOSPITAL ENCOUNTER (OUTPATIENT)
Dept: MRI IMAGING | Facility: HOSPITAL | Age: 82
End: 2022-08-04

## 2022-08-05 ENCOUNTER — HOSPITAL ENCOUNTER (OUTPATIENT)
Dept: MRI IMAGING | Facility: HOSPITAL | Age: 82
Discharge: HOME | End: 2022-08-05
Admitting: INTERNAL MEDICINE

## 2022-08-05 DIAGNOSIS — M54.16 LUMBAR RADICULOPATHY: ICD-10-CM

## 2022-08-05 DIAGNOSIS — G89.29 CHRONIC BILATERAL LOWER ABDOMINAL PAIN: ICD-10-CM

## 2022-08-05 DIAGNOSIS — R10.31 CHRONIC BILATERAL LOWER ABDOMINAL PAIN: ICD-10-CM

## 2022-08-05 DIAGNOSIS — R10.32 CHRONIC BILATERAL LOWER ABDOMINAL PAIN: ICD-10-CM

## 2022-08-05 PROCEDURE — 72148 MRI LUMBAR SPINE W/O DYE: CPT

## 2024-05-17 ENCOUNTER — HOSPITAL ENCOUNTER (EMERGENCY)
Facility: HOSPITAL | Age: 84
Discharge: HOME | End: 2024-05-17
Attending: EMERGENCY MEDICINE
Payer: MEDICARE

## 2024-05-17 ENCOUNTER — APPOINTMENT (OUTPATIENT)
Dept: GENERAL RADIOLOGY | Facility: HOSPITAL | Age: 84
End: 2024-05-17
Payer: MEDICARE

## 2024-05-17 VITALS
TEMPERATURE: 97.7 F | RESPIRATION RATE: 16 BRPM | DIASTOLIC BLOOD PRESSURE: 88 MMHG | HEIGHT: 62 IN | OXYGEN SATURATION: 95 % | SYSTOLIC BLOOD PRESSURE: 177 MMHG | HEART RATE: 48 BPM | WEIGHT: 175 LBS | BODY MASS INDEX: 32.2 KG/M2

## 2024-05-17 DIAGNOSIS — R00.2 PALPITATIONS: Primary | ICD-10-CM

## 2024-05-17 LAB
ALBUMIN SERPL-MCNC: 3.8 G/DL (ref 3.5–5.2)
ALBUMIN/GLOB SERPL: 1.7 G/DL
ALP SERPL-CCNC: 60 U/L (ref 39–117)
ALT SERPL W P-5'-P-CCNC: 14 U/L (ref 1–33)
ANION GAP SERPL CALCULATED.3IONS-SCNC: 10.5 MMOL/L (ref 5–15)
AST SERPL-CCNC: 18 U/L (ref 1–32)
BASOPHILS # BLD AUTO: 0.04 10*3/MM3 (ref 0–0.2)
BASOPHILS NFR BLD AUTO: 1.1 % (ref 0–1.5)
BILIRUB SERPL-MCNC: 0.4 MG/DL (ref 0–1.2)
BILIRUB UR QL STRIP: NEGATIVE
BUN SERPL-MCNC: 21 MG/DL (ref 8–23)
BUN/CREAT SERPL: 23.9 (ref 7–25)
CALCIUM SPEC-SCNC: 8.2 MG/DL (ref 8.6–10.5)
CHLORIDE SERPL-SCNC: 104 MMOL/L (ref 98–107)
CLARITY UR: CLEAR
CO2 SERPL-SCNC: 24.5 MMOL/L (ref 22–29)
COLOR UR: YELLOW
CREAT SERPL-MCNC: 0.88 MG/DL (ref 0.57–1)
DEPRECATED RDW RBC AUTO: 47.8 FL (ref 37–54)
EGFRCR SERPLBLD CKD-EPI 2021: 64.5 ML/MIN/1.73
EOSINOPHIL # BLD AUTO: 0.08 10*3/MM3 (ref 0–0.4)
EOSINOPHIL NFR BLD AUTO: 2.1 % (ref 0.3–6.2)
ERYTHROCYTE [DISTWIDTH] IN BLOOD BY AUTOMATED COUNT: 13.1 % (ref 12.3–15.4)
GLOBULIN UR ELPH-MCNC: 2.2 GM/DL
GLUCOSE SERPL-MCNC: 141 MG/DL (ref 65–99)
GLUCOSE UR STRIP-MCNC: NEGATIVE MG/DL
HCT VFR BLD AUTO: 33.7 % (ref 34–46.6)
HGB BLD-MCNC: 10.7 G/DL (ref 12–15.9)
HGB UR QL STRIP.AUTO: NEGATIVE
IMM GRANULOCYTES # BLD AUTO: 0.05 10*3/MM3 (ref 0–0.05)
IMM GRANULOCYTES NFR BLD AUTO: 1.3 % (ref 0–0.5)
KETONES UR QL STRIP: NEGATIVE
LEUKOCYTE ESTERASE UR QL STRIP.AUTO: NEGATIVE
LYMPHOCYTES # BLD AUTO: 0.56 10*3/MM3 (ref 0.7–3.1)
LYMPHOCYTES NFR BLD AUTO: 14.8 % (ref 19.6–45.3)
MCH RBC QN AUTO: 31.5 PG (ref 26.6–33)
MCHC RBC AUTO-ENTMCNC: 31.8 G/DL (ref 31.5–35.7)
MCV RBC AUTO: 99.1 FL (ref 79–97)
MONOCYTES # BLD AUTO: 0.81 10*3/MM3 (ref 0.1–0.9)
MONOCYTES NFR BLD AUTO: 21.4 % (ref 5–12)
NEUTROPHILS NFR BLD AUTO: 2.24 10*3/MM3 (ref 1.7–7)
NEUTROPHILS NFR BLD AUTO: 59.3 % (ref 42.7–76)
NITRITE UR QL STRIP: NEGATIVE
PH UR STRIP.AUTO: 6 [PH] (ref 4.5–8)
PLAT MORPH BLD: NORMAL
PLATELET # BLD AUTO: 101 10*3/MM3 (ref 140–450)
PMV BLD AUTO: 12.3 FL (ref 6–12)
POTASSIUM SERPL-SCNC: 4 MMOL/L (ref 3.5–5.2)
PROT SERPL-MCNC: 6 G/DL (ref 6–8.5)
PROT UR QL STRIP: NEGATIVE
QT INTERVAL: 441 MS
QTC INTERVAL: 448 MS
RBC # BLD AUTO: 3.4 10*6/MM3 (ref 3.77–5.28)
RBC MORPH BLD: NORMAL
SODIUM SERPL-SCNC: 139 MMOL/L (ref 136–145)
SP GR UR STRIP: 1.02 (ref 1–1.03)
TROPONIN T SERPL HS-MCNC: 8 NG/L
UROBILINOGEN UR QL STRIP: NORMAL
WBC MORPH BLD: NORMAL
WBC NRBC COR # BLD AUTO: 3.78 10*3/MM3 (ref 3.4–10.8)

## 2024-05-17 PROCEDURE — 84484 ASSAY OF TROPONIN QUANT: CPT | Performed by: EMERGENCY MEDICINE

## 2024-05-17 PROCEDURE — 80053 COMPREHEN METABOLIC PANEL: CPT | Performed by: EMERGENCY MEDICINE

## 2024-05-17 PROCEDURE — 85007 BL SMEAR W/DIFF WBC COUNT: CPT | Performed by: EMERGENCY MEDICINE

## 2024-05-17 PROCEDURE — 36415 COLL VENOUS BLD VENIPUNCTURE: CPT

## 2024-05-17 PROCEDURE — 71045 X-RAY EXAM CHEST 1 VIEW: CPT

## 2024-05-17 PROCEDURE — 93005 ELECTROCARDIOGRAM TRACING: CPT | Performed by: EMERGENCY MEDICINE

## 2024-05-17 PROCEDURE — 99284 EMERGENCY DEPT VISIT MOD MDM: CPT

## 2024-05-17 PROCEDURE — 81003 URINALYSIS AUTO W/O SCOPE: CPT | Performed by: EMERGENCY MEDICINE

## 2024-05-17 PROCEDURE — 85025 COMPLETE CBC W/AUTO DIFF WBC: CPT | Performed by: EMERGENCY MEDICINE

## 2024-05-17 PROCEDURE — 93005 ELECTROCARDIOGRAM TRACING: CPT

## 2024-05-17 NOTE — ED PROVIDER NOTES
"Subjective   History of Present Illness  83-year-old female presents emergency room complaining of \"twitches \"in her chest and fatigue.  Patient states that for approximately last week she has occasionally felt twitches in her chest.  Patient states these are not painful they are just a sensation that lasts for approximately a second and can occur every few minutes for up to 30 minutes of time.  Patient states she is had these before but not in many years.  Patient states she is also felt weak for the last week or so.  Patient states she saw her primary care physician approximately 1 week ago for left knee pain but did not mention any of the symptoms at the time.      Review of Systems   Constitutional:  Positive for fatigue. Negative for activity change, appetite change, chills, diaphoresis and fever.   HENT:  Negative for congestion, rhinorrhea and sore throat.    Eyes:  Negative for photophobia and visual disturbance.   Respiratory:  Negative for cough and shortness of breath.    Cardiovascular:  Positive for palpitations. Negative for chest pain and leg swelling.   Gastrointestinal:  Negative for abdominal distention, abdominal pain, diarrhea, nausea and vomiting.   Genitourinary:  Negative for dysuria and flank pain.   Musculoskeletal:  Negative for arthralgias and back pain.   Skin:  Negative for rash.   Neurological:  Negative for dizziness, weakness and headaches.   Psychiatric/Behavioral:  Negative for agitation and behavioral problems.        Past Medical History:   Diagnosis Date    Adverse reaction to metoprolol     Nightmares, vivid dreams, and sleep disturbance with metoprolol in the past    CAD (coronary artery disease)     Cath 3/4/10: 30-40% spasm of ostial RCA.  PDA with 30% mid lesion.  Left coronaries normal.    Chest pain     History of recurrent non-cardiac chest pain    Diabetes mellitus     Hyperlipidemia     Hypertension        No Known Allergies    Past Surgical History:   Procedure " Laterality Date    BILATERAL BREAST REDUCTION      TOTAL ABDOMINAL HYSTERECTOMY WITH SALPINGO OOPHORECTOMY         Family History   Problem Relation Age of Onset    Heart disease Mother         pacemaker    Hypertension Mother     Heart attack Maternal Grandmother     Heart attack Maternal Grandfather     Hypertension Father     Other Sister         small facial stroke       Social History     Socioeconomic History    Marital status: Single   Tobacco Use    Smoking status: Former    Smokeless tobacco: Never   Substance and Sexual Activity    Alcohol use: No     Comment: Daily caffeine use    Drug use: No    Sexual activity: Defer           Objective   Physical Exam  Vitals and nursing note reviewed.   Constitutional:       General: She is not in acute distress.     Appearance: Normal appearance. She is not ill-appearing, toxic-appearing or diaphoretic.      Comments: 83-year-old female in no acute distress   HENT:      Head: Normocephalic and atraumatic.      Nose: Nose normal.      Mouth/Throat:      Mouth: Mucous membranes are moist.   Eyes:      Conjunctiva/sclera: Conjunctivae normal.   Cardiovascular:      Rate and Rhythm: Normal rate and regular rhythm.      Pulses: Normal pulses.   Pulmonary:      Effort: Pulmonary effort is normal.      Breath sounds: Normal breath sounds.   Abdominal:      General: Abdomen is flat. There is no distension.      Tenderness: There is no abdominal tenderness.   Musculoskeletal:         General: No swelling. Normal range of motion.      Cervical back: Normal range of motion and neck supple.      Right lower leg: No edema.      Left lower leg: No edema.   Skin:     General: Skin is warm and dry.   Neurological:      General: No focal deficit present.      Mental Status: She is alert and oriented to person, place, and time.   Psychiatric:         Mood and Affect: Mood normal.         Procedures           ED Course  ED Course as of 05/17/24 1504   Fri May 17, 2024   1335 EKG time  1207  Normal sinus rhythm  Heart rate 62  Axes are normal  Intervals are normal  No acute ST abnormalities noted [BH]   1409 CBC is stable when compared to previous labs [BH]   1409 HS Troponin T: 8 [BH]   1409 Glucose(!): 141 [BH]   1409 BUN: 21 [BH]   1409 Creatinine: 0.88 [BH]   1409 Sodium: 139 [BH]   1409 Potassium: 4.0 [BH]   1409 Chloride: 104 [BH]   1409 CO2: 24.5 [BH]   1409 Calcium(!): 8.2 [BH]   1501 Spoke with patient concerning lab values and chest x-ray as well as EKG findings specifically no acute abnormality noted.  Patient remains pain-free and essentially normal in bed during reassessment.  Patient is to follow-up with her primary care physician and cardiologist as scheduled next week.  Patient can also return the emergency room for new persistent or worsening symptoms.  Patient states she understands agrees with plan and all questions were answered to satisfaction. []      ED Course User Index  [] Evaristo Junior MD                                             Medical Decision Making  My differential diagnosis for palpitations includes but is not limited to sinus tachycardia, SVT, PACs, atrial fibrillation, atrial flutter, PVCs, V. fib, V. tach, ACS, toxins, drug abuse, electrolyte abnormalities and anxiety attacks.     Problems Addressed:  Palpitations: complicated acute illness or injury    Amount and/or Complexity of Data Reviewed  Labs: ordered. Decision-making details documented in ED Course.  Radiology: ordered. Decision-making details documented in ED Course.  ECG/medicine tests: ordered. Decision-making details documented in ED Course.        Final diagnoses:   Palpitations       ED Disposition  ED Disposition       ED Disposition   Discharge    Condition   Stable    Comment   --               Sandra Foley MD  [address] [address]  [phone]    Schedule an appointment as soon as possible for a visit       [facility] EMERGENCY  [id number] [facility]  [place] [address] [phone]  Go to   As needed         Medication List      No changes were made to your prescriptions during this visit.            Evaristo Junior MD  05/17/24 3533

## 2024-05-22 ENCOUNTER — TELEPHONE (OUTPATIENT)
Dept: CARDIOLOGY | Facility: CLINIC | Age: 84
End: 2024-05-22

## 2024-05-22 ENCOUNTER — OFFICE VISIT (OUTPATIENT)
Dept: CARDIOLOGY | Facility: CLINIC | Age: 84
End: 2024-05-22
Payer: MEDICARE

## 2024-05-22 VITALS
SYSTOLIC BLOOD PRESSURE: 158 MMHG | BODY MASS INDEX: 31.65 KG/M2 | HEIGHT: 62 IN | WEIGHT: 172 LBS | HEART RATE: 56 BPM | DIASTOLIC BLOOD PRESSURE: 84 MMHG

## 2024-05-22 DIAGNOSIS — I25.10 CORONARY ARTERY DISEASE INVOLVING NATIVE CORONARY ARTERY OF NATIVE HEART WITHOUT ANGINA PECTORIS: ICD-10-CM

## 2024-05-22 DIAGNOSIS — R00.1 BRADYCARDIA, SINUS: Primary | ICD-10-CM

## 2024-05-22 DIAGNOSIS — R06.09 EXERTIONAL DYSPNEA: ICD-10-CM

## 2024-05-22 PROCEDURE — 93000 ELECTROCARDIOGRAM COMPLETE: CPT | Performed by: INTERNAL MEDICINE

## 2024-05-22 PROCEDURE — 99204 OFFICE O/P NEW MOD 45 MIN: CPT | Performed by: INTERNAL MEDICINE

## 2024-05-22 PROCEDURE — 1159F MED LIST DOCD IN RCRD: CPT | Performed by: INTERNAL MEDICINE

## 2024-05-22 PROCEDURE — 1160F RVW MEDS BY RX/DR IN RCRD: CPT | Performed by: INTERNAL MEDICINE

## 2024-05-22 RX ORDER — HYDROCODONE BITARTRATE AND ACETAMINOPHEN 7.5; 325 MG/1; MG/1
1 TABLET ORAL AS NEEDED
COMMUNITY
Start: 2024-05-08 | End: 2024-06-07

## 2024-05-22 RX ORDER — PANTOPRAZOLE SODIUM 40 MG/1
40 TABLET, DELAYED RELEASE ORAL DAILY
COMMUNITY
Start: 2024-05-08 | End: 2025-05-08

## 2024-05-22 RX ORDER — COLCHICINE 0.6 MG/1
0.6 TABLET ORAL DAILY
COMMUNITY
Start: 2024-05-09 | End: 2024-08-07

## 2024-05-22 RX ORDER — TRETINOIN 0.5 MG/G
CREAM TOPICAL
COMMUNITY
Start: 2024-02-21

## 2024-05-22 NOTE — TELEPHONE ENCOUNTER
Caller: Ale Fagan    Relationship: Emergency Contact    Best call back number: [phone]    What is the best time to reach you: ANY    Who are you requesting to speak with (clinical staff, provider,  specific staff member):     Do you know the name of the person who called: SRINI    What was the call regarding: PT MISSED A CALL, THE MESSAGE WASN'T CLEAR PLEASE CALL BACK.

## 2024-05-22 NOTE — PROGRESS NOTES
[facility]      Patient Name: Plama Paredes  :1940  Age: 83 y.o.  Encounter Provider:  Daren Rodrigues Jr, MD      Chief Complaint:   Chief Complaint   Patient presents with    fatigue    Hypertension    Shortness of Breath         Hypertension  Associated symptoms include malaise/fatigue and palpitations. Pertinent negatives include no chest pain, orthopnea, PND or shortness of breath.   Shortness of Breath  Pertinent negatives include no abdominal pain, chest pain, fever, leg swelling, orthopnea, PND, rash, sore throat, syncope or wheezing.     Palma Paredes is a 83 y.o. female with known history of mild nonobstructive coronary artery disease who previously followed with Dr. Shoemaker presents for evaluation of fatigue/weakness and exertional dyspnea.  Patient was initially seen at [facility] in  for very atypical chest discomfort.  She was found on coronary angiogram to have mild nonobstructive coronary disease.  Patient states that she was feeling well and last saw Dr. Shoemaker in 2017.  She decided to come to the emergency room the other day for weakness and dyspnea on exertion.  She denies orthopnea, PND or edema.  No angina.  She states that her heart rate has been in the 40s over the last week but today in clinic resting heart rate is 56 bpm and EKG shows sinus rhythm with no acute ischemic changes.  Blood pressure is elevated today 158/84 mmHg.  He saw her PCP today who discontinued atenolol and started her on amlodipine.  Her cholesterol has been very well-controlled on rosuvastatin.  She is tolerating aspirin well.  She does have family history of second-degree relatives but no first-degree relatives with coronary artery disease.  Last LDL 72.  She smoked for a very brief period of time but is not smoked cigarettes in over 50 years.      The following portions of the patient's history were reviewed and updated as appropriate: allergies, current medications, past  "family history, past medical history, past social history, past surgical history and problem list.      Review of Systems   Constitutional: Positive for malaise/fatigue. Negative for chills and fever.   HENT:  Negative for hoarse voice and sore throat.    Eyes:  Negative for double vision and photophobia.   Cardiovascular:  Positive for dyspnea on exertion and palpitations. Negative for chest pain, leg swelling, near-syncope, orthopnea, paroxysmal nocturnal dyspnea and syncope.   Respiratory:  Negative for cough, shortness of breath and wheezing.    Skin:  Negative for poor wound healing and rash.   Musculoskeletal:  Negative for arthritis and joint swelling.   Gastrointestinal:  Negative for bloating, abdominal pain, hematemesis and hematochezia.   Neurological:  Negative for dizziness and focal weakness.   Psychiatric/Behavioral:  Negative for depression and suicidal ideas.      OBJECTIVE:   Vital Signs  Vitals:    05/22/24 1322   BP: 158/84   Pulse: 56     Estimated body mass index is 31.46 kg/m² as calculated from the following:    Height as of this encounter: 157.5 cm (62\").    Weight as of this encounter: 78 kg (172 lb).    Vitals reviewed.   Constitutional:       Appearance: Healthy appearance. Not in distress.   Neck:      Vascular: No JVR. JVD normal.   Pulmonary:      Effort: Pulmonary effort is normal.      Breath sounds: Normal breath sounds. No wheezing. No rhonchi. No rales.   Chest:      Chest wall: Not tender to palpatation.   Cardiovascular:      PMI at left midclavicular line. Normal rate. Regular rhythm. Normal S1. Normal S2.       Murmurs: There is no murmur.      No gallop.  No click. No rub.   Pulses:     Intact distal pulses.   Edema:     Peripheral edema absent.   Abdominal:      General: Bowel sounds are normal.      Palpations: Abdomen is soft.      Tenderness: There is no abdominal tenderness.   Musculoskeletal: Normal range of motion.         General: No tenderness. Skin:     General: " Skin is warm and dry.   Neurological:      General: No focal deficit present.      Mental Status: Alert and oriented to person, place and time.       ECG 12 Lead    Date/Time: 5/22/2024 1:24 PM  Performed by: Daren Rodrigues Jr., MD    Authorized by: Daren Rodrigues Jr., MD  Comparison: compared with previous ECG from 5/4/2017  Similar to previous ECG  Rhythm: sinus rhythm    Clinical impression: normal ECG           BUN   Date Value Ref Range Status   05/17/2024 21 8 - 23 mg/dL Final   01/30/2023 12 10 - 20 mg/dL Final     Creatinine   Date Value Ref Range Status   05/17/2024 0.88 0.57 - 1.00 mg/dL Final   01/30/2023 0.81 0.55 - 1.02 mg/dL Final   02/07/2022 166.0 15 - 500 mg/dL Final     Potassium   Date Value Ref Range Status   05/17/2024 4.0 3.5 - 5.2 mmol/L Final   01/30/2023 4.3 3.5 - 5.1 mmol/L Final     ALT (SGPT)   Date Value Ref Range Status   05/17/2024 14 1 - 33 U/L Final   01/30/2023 19 10 - 35 U/L Final     AST (SGOT)   Date Value Ref Range Status   05/17/2024 18 1 - 32 U/L Final   01/30/2023 20 10 - 35 U/L Final           ASSESSMENT:     83-year-old female with known history of coronary artery disease without angina presents for evaluation of fatigue/weakness and exertional dyspnea.      PLAN OF CARE:     Exertional dyspnea -etiology uncertain.  Continue aspirin and statin.  No angina.  No orthopnea, PND or edema.  Check echocardiogram.  Bradycardia -patient was on moderate dose of atenolol that was discontinued today.  We will check a Holter monitor.  Coronary artery disease without angina -continue aspirin and statin.  Beta-blocker held given clinical story.    Return to clinic 6 months             Discharge Medications            Accurate as of May 22, 2024  1:24 PM. If you have any questions, ask your nurse or doctor.                Continue These Medications        Instructions Start Date   aspirin 81 MG tablet   81 mg, Oral, Daily      atenolol 50 MG tablet  Commonly known as: TENORMIN   50 mg,  Daily      colchicine 0.6 MG tablet   0.6 mg, Oral, Daily      Cymbalta 30 MG capsule  Generic drug: DULoxetine   30 mg, Daily      FLUoxetine 10 MG capsule  Commonly known as: PROzac   10 mg, Oral, Daily      HYDROcodone-acetaminophen 7.5-325 MG per tablet  Commonly known as: NORCO   1 tablet, Oral, As Needed      lisinopril 20 MG tablet  Commonly known as: PRINIVIL,ZESTRIL   20 mg, Oral, Every 24 Hours Scheduled      LOSARTAN POTASSIUM PO   100 mg, Oral      metFORMIN 1000 MG tablet  Commonly known as: GLUCOPHAGE   1,000 mg, Daily With Breakfast      pantoprazole 40 MG EC tablet  Commonly known as: PROTONIX   40 mg, Oral, Daily      Potassium 99 MG tablet   Daily      Rosuvastatin Calcium 5 MG capsule sprinkle   5 mg, Oral, Daily      saccharomyces boulardii 250 MG capsule  Commonly known as: Florastor   250 mg, Oral, 2 Times Daily      spironolactone-hydrochlorothiazide 25-25 MG tablet  Commonly known as: ALDACTAZIDE   Daily      traZODone 50 MG tablet  Commonly known as: DESYREL   25 mg, Oral, Nightly      tretinoin 0.05 % cream  Commonly known as: RETIN-A   APPLY CREAM TOPICALLY NIGHTLY TO AFFECTED AREA      Ventolin  (90 Base) MCG/ACT inhaler  Generic drug: albuterol sulfate HFA   2 puffs, Every 4 Hours PRN      vitamin D 1.25 MG (42034 UT) capsule capsule  Commonly known as: ERGOCALCIFEROL   50,000 Units, Every 7 Days      zolpidem 10 MG tablet  Commonly known as: AMBIEN   10 mg, Daily               Thank you for allowing me to participate in the care of your patient,      Sincerely,   Daren Rodrigues MD  [facility]  05/22/24  13:24 EDT

## 2024-05-29 ENCOUNTER — TELEPHONE (OUTPATIENT)
Dept: CARDIOLOGY | Facility: CLINIC | Age: 84
End: 2024-05-29
Payer: MEDICARE

## 2024-05-29 NOTE — TELEPHONE ENCOUNTER
Caller: Ale Fagan    Relationship: Emergency Contact    Best call back number:[phone]    What is the best time to reach you: ANY    Who are you requesting to speak with (clinical staff, provider,  specific staff member): CLINICAL    Do you know the name of the person who called: SRINI    What was the call regarding: PT RECIEVED A ZIO PATCH FROM [facility] (WAS TOLD TO CANCEL THAT AND WAIT ON [employer]) HASN'T HEARD FROM [employer] YET AND WANTS TO KNOW IF SHE SHOULD STILL BE WAITING FOR A CALL BACK OR IF SHE SHOULD USE THE MONITOR SENT BY [facility]  PLEASE CALL TO ADVISE

## 2024-06-11 ENCOUNTER — HOSPITAL ENCOUNTER (OUTPATIENT)
Dept: CARDIOLOGY | Facility: HOSPITAL | Age: 84
Discharge: HOME | End: 2024-06-11
Admitting: INTERNAL MEDICINE
Payer: MEDICARE

## 2024-06-11 VITALS
DIASTOLIC BLOOD PRESSURE: 72 MMHG | HEIGHT: 62 IN | BODY MASS INDEX: 31.65 KG/M2 | HEART RATE: 74 BPM | SYSTOLIC BLOOD PRESSURE: 170 MMHG | WEIGHT: 172 LBS

## 2024-06-11 DIAGNOSIS — R06.09 EXERTIONAL DYSPNEA: ICD-10-CM

## 2024-06-11 LAB
AORTIC ARCH: 2.9 CM
ASCENDING AORTA: 3 CM
BH CV ECHO MEAS - ACS: 1.82 CM
BH CV ECHO MEAS - AI P1/2T: 1207 MSEC
BH CV ECHO MEAS - AO MAX PG: 9.1 MMHG
BH CV ECHO MEAS - AO MEAN PG: 5 MMHG
BH CV ECHO MEAS - AO ROOT DIAM: 3.2 CM
BH CV ECHO MEAS - AO V2 MAX: 151 CM/SEC
BH CV ECHO MEAS - AO V2 VTI: 31.2 CM
BH CV ECHO MEAS - AVA(I,D): 2.1 CM2
BH CV ECHO MEAS - EDV(CUBED): 97.3 ML
BH CV ECHO MEAS - EDV(MOD-SP2): 77 ML
BH CV ECHO MEAS - EDV(MOD-SP4): 70 ML
BH CV ECHO MEAS - EF(MOD-BP): 66.2 %
BH CV ECHO MEAS - EF(MOD-SP2): 64.9 %
BH CV ECHO MEAS - EF(MOD-SP4): 67.1 %
BH CV ECHO MEAS - ESV(CUBED): 28.9 ML
BH CV ECHO MEAS - ESV(MOD-SP2): 27 ML
BH CV ECHO MEAS - ESV(MOD-SP4): 23 ML
BH CV ECHO MEAS - FS: 33.2 %
BH CV ECHO MEAS - IVS/LVPW: 1.11 CM
BH CV ECHO MEAS - IVSD: 1 CM
BH CV ECHO MEAS - LAT PEAK E' VEL: 6.3 CM/SEC
BH CV ECHO MEAS - LV DIASTOLIC VOL/BSA (35-75): 39 CM2
BH CV ECHO MEAS - LV MASS(C)D: 148.1 GRAMS
BH CV ECHO MEAS - LV MAX PG: 5.2 MMHG
BH CV ECHO MEAS - LV MEAN PG: 2 MMHG
BH CV ECHO MEAS - LV SYSTOLIC VOL/BSA (12-30): 12.8 CM2
BH CV ECHO MEAS - LV V1 MAX: 114 CM/SEC
BH CV ECHO MEAS - LV V1 VTI: 23.5 CM
BH CV ECHO MEAS - LVIDD: 4.6 CM
BH CV ECHO MEAS - LVIDS: 3.1 CM
BH CV ECHO MEAS - LVOT AREA: 2.8 CM2
BH CV ECHO MEAS - LVOT DIAM: 1.89 CM
BH CV ECHO MEAS - LVPWD: 0.9 CM
BH CV ECHO MEAS - MED PEAK E' VEL: 6.7 CM/SEC
BH CV ECHO MEAS - MR MAX PG: 68.1 MMHG
BH CV ECHO MEAS - MR MAX VEL: 412.6 CM/SEC
BH CV ECHO MEAS - MV A DUR: 0.11 SEC
BH CV ECHO MEAS - MV A MAX VEL: 84.6 CM/SEC
BH CV ECHO MEAS - MV DEC SLOPE: 274.7 CM/SEC2
BH CV ECHO MEAS - MV DEC TIME: 0.28 SEC
BH CV ECHO MEAS - MV E MAX VEL: 83.9 CM/SEC
BH CV ECHO MEAS - MV E/A: 0.99
BH CV ECHO MEAS - MV MAX PG: 4.6 MMHG
BH CV ECHO MEAS - MV MEAN PG: 2.04 MMHG
BH CV ECHO MEAS - MV P1/2T: 110 MSEC
BH CV ECHO MEAS - MV V2 VTI: 34.3 CM
BH CV ECHO MEAS - MVA(P1/2T): 2 CM2
BH CV ECHO MEAS - MVA(VTI): 1.91 CM2
BH CV ECHO MEAS - PA ACC TIME: 0.09 SEC
BH CV ECHO MEAS - PA V2 MAX: 123.5 CM/SEC
BH CV ECHO MEAS - PULM A REVS DUR: 0.1 SEC
BH CV ECHO MEAS - PULM A REVS VEL: 25.2 CM/SEC
BH CV ECHO MEAS - PULM DIAS VEL: 37.6 CM/SEC
BH CV ECHO MEAS - PULM S/D: 1.49
BH CV ECHO MEAS - PULM SYS VEL: 55.9 CM/SEC
BH CV ECHO MEAS - QP/QS: 1.01
BH CV ECHO MEAS - RAP SYSTOLE: 3 MMHG
BH CV ECHO MEAS - RV MAX PG: 5.2 MMHG
BH CV ECHO MEAS - RV V1 MAX: 114.3 CM/SEC
BH CV ECHO MEAS - RV V1 VTI: 24.2 CM
BH CV ECHO MEAS - RVOT DIAM: 1.87 CM
BH CV ECHO MEAS - RVSP: 37.4 MMHG
BH CV ECHO MEAS - SUP REN AO DIAM: 2.1 CM
BH CV ECHO MEAS - SV(LVOT): 65.6 ML
BH CV ECHO MEAS - SV(MOD-SP2): 50 ML
BH CV ECHO MEAS - SV(MOD-SP4): 47 ML
BH CV ECHO MEAS - SV(RVOT): 66.4 ML
BH CV ECHO MEAS - SVI(LVOT): 36.6 ML/M2
BH CV ECHO MEAS - SVI(MOD-SP2): 27.9 ML/M2
BH CV ECHO MEAS - SVI(MOD-SP4): 26.2 ML/M2
BH CV ECHO MEAS - TAPSE (>1.6): 2.11 CM
BH CV ECHO MEAS - TR MAX PG: 34.4 MMHG
BH CV ECHO MEAS - TR MAX VEL: 293.2 CM/SEC
BH CV ECHO MEASUREMENTS AVERAGE E/E' RATIO: 12.91
BH CV XLRA - RV BASE: 2.7 CM
BH CV XLRA - RV LENGTH: 7 CM
BH CV XLRA - RV MID: 3.3 CM
BH CV XLRA - TDI S': 14.7 CM/SEC
LEFT ATRIUM VOLUME INDEX: 42.4 ML/M2
SINUS: 2.8 CM
STJ: 2.42 CM

## 2024-06-11 PROCEDURE — 93306 TTE W/DOPPLER COMPLETE: CPT

## 2024-06-11 PROCEDURE — 93306 TTE W/DOPPLER COMPLETE: CPT | Performed by: INTERNAL MEDICINE

## 2024-06-12 NOTE — PROGRESS NOTES
Please let the patient know that echocardiogram shows normal heart function.  No indication for further testing or medication changes at this time.

## 2024-06-13 ENCOUNTER — TELEPHONE (OUTPATIENT)
Dept: CARDIOLOGY | Facility: CLINIC | Age: 84
End: 2024-06-13
Payer: MEDICARE

## 2024-06-13 NOTE — TELEPHONE ENCOUNTER
----- Message from Daren Rodrigues sent at 6/12/2024  4:18 PM EDT -----  Please let the patient know that echocardiogram shows normal heart function.  No indication for further testing or medication changes at this time.

## 2024-06-13 NOTE — TELEPHONE ENCOUNTER
Called and left VM, will continue to try to reach pt.    HUB- please put patient straight through to triage    Christine Laguna, RN  Triage RN  06/13/24 08:58 EDT

## 2024-06-14 ENCOUNTER — TELEPHONE (OUTPATIENT)
Dept: CARDIOLOGY | Facility: CLINIC | Age: 84
End: 2024-06-14
Payer: MEDICARE

## 2024-06-14 RX ORDER — AMLODIPINE BESYLATE 5 MG/1
2.5 TABLET ORAL DAILY
COMMUNITY

## 2024-06-14 NOTE — TELEPHONE ENCOUNTER
Reviewed results and recommendations with Palma Paredes.  Patient verbalized understanding of results and recommendations.    Patient reports low blood pressure and symptoms that started occurring around 4-5 pm each day.  A couple of days ago, patient reports an episode of eye pain that radiated up over the top of her right ear.  As she was feeling poorly, she checked her BP: 86/55, HR 67.  Yesterday, patient reports low BP readings: 110/55.    Patient then passed phone to family member who reported patient's PCP added new BP medication about 2 weeks ago.    Cardiac Meds  Losartan 100 mg, every morning  Amlodipine 5 mg, every morning  Aspirin 81 mg, daily    Requested patient keep BP/HR log over the next week and provide to office via phone or MyChart, and family member stated she will have patient do this.    Please let me know how you would like to proceed.    Thank you,  Codie CAMACHO RN  Triage Nurse [facility]   10:48 EDT

## 2024-06-14 NOTE — TELEPHONE ENCOUNTER
Please inform patient Holter monitor is benign.  She has some short racing spells but nothing sustained.  Average heart rate was 72.  Okay to keep next scheduled office follow-up appointment

## 2024-06-14 NOTE — TELEPHONE ENCOUNTER
Called Palma Paredes to review recommendations and she passed the phone to her daughter, Ale.  Reviewed recommendations with Ale and she verbalized understanding of recommendations.  She stated patient has already taken her meds this morning and will start new regimen tomorrow, as directed.  Reminded to send BP/HR log in 1 week and Ale verbalized understanding.    Thank you,  Codie CAMACHO RN  Triage Nurse [facility]   11:25 EDT

## 2024-06-14 NOTE — TELEPHONE ENCOUNTER
Reviewed results and recommendations with Palma Paredes.  Patient verbalized understanding of results and recommendations.    Thank you,  Codie CAMACHO RN  Triage Nurse [facility]   10:43 EDT

## 2024-06-14 NOTE — TELEPHONE ENCOUNTER
Amlodipine/Norvasc 5 mg is new.  I would have her split that in half moving forward    I would have her take losartan 100 mg in the morning and take amlodipine 2.5 mg in the evening starting tomorrow if she has already had these today.  I will edit her list to reflect this change

## 2024-06-14 NOTE — TELEPHONE ENCOUNTER
Left voicemail for Palma Paredes requesting callback.    HUB: please transfer to Triage if patient returns call    Thank you,  Codie CAMACHO RN  Triage Nurse [facility]   08:15 EDT    No

## 2025-02-26 ENCOUNTER — APPOINTMENT (OUTPATIENT)
Dept: GENERAL RADIOLOGY | Facility: HOSPITAL | Age: 85
End: 2025-02-26
Payer: MEDICARE

## 2025-02-26 ENCOUNTER — APPOINTMENT (OUTPATIENT)
Dept: CT IMAGING | Facility: HOSPITAL | Age: 85
End: 2025-02-26
Payer: MEDICARE

## 2025-02-26 ENCOUNTER — HOSPITAL ENCOUNTER (EMERGENCY)
Facility: HOSPITAL | Age: 85
Discharge: HOME | End: 2025-02-26
Attending: EMERGENCY MEDICINE | Admitting: EMERGENCY MEDICINE
Payer: MEDICARE

## 2025-02-26 VITALS
SYSTOLIC BLOOD PRESSURE: 187 MMHG | RESPIRATION RATE: 18 BRPM | TEMPERATURE: 98 F | HEART RATE: 66 BPM | DIASTOLIC BLOOD PRESSURE: 71 MMHG | WEIGHT: 169 LBS | BODY MASS INDEX: 31.1 KG/M2 | HEIGHT: 62 IN | OXYGEN SATURATION: 97 %

## 2025-02-26 DIAGNOSIS — S22.42XA CLOSED FRACTURE OF MULTIPLE RIBS OF LEFT SIDE, INITIAL ENCOUNTER: Primary | ICD-10-CM

## 2025-02-26 PROCEDURE — 70450 CT HEAD/BRAIN W/O DYE: CPT

## 2025-02-26 PROCEDURE — 72125 CT NECK SPINE W/O DYE: CPT

## 2025-02-26 PROCEDURE — 90471 IMMUNIZATION ADMIN: CPT | Performed by: PHYSICIAN ASSISTANT

## 2025-02-26 PROCEDURE — 25010000002 TETANUS-DIPHTH-ACELL PERTUSSIS 5-2.5-18.5 LF-MCG/0.5 SUSPENSION PREFILLED SYRINGE: Performed by: PHYSICIAN ASSISTANT

## 2025-02-26 PROCEDURE — 99284 EMERGENCY DEPT VISIT MOD MDM: CPT | Performed by: EMERGENCY MEDICINE

## 2025-02-26 PROCEDURE — 73110 X-RAY EXAM OF WRIST: CPT

## 2025-02-26 PROCEDURE — 73562 X-RAY EXAM OF KNEE 3: CPT

## 2025-02-26 PROCEDURE — 99284 EMERGENCY DEPT VISIT MOD MDM: CPT

## 2025-02-26 PROCEDURE — 90715 TDAP VACCINE 7 YRS/> IM: CPT | Performed by: PHYSICIAN ASSISTANT

## 2025-02-26 PROCEDURE — 71101 X-RAY EXAM UNILAT RIBS/CHEST: CPT

## 2025-02-26 RX ORDER — LIDOCAINE 4 G/G
1 PATCH TOPICAL
Status: DISCONTINUED | OUTPATIENT
Start: 2025-02-26 | End: 2025-02-26 | Stop reason: HOSPADM

## 2025-02-26 RX ORDER — ACETAMINOPHEN 325 MG/1
650 TABLET ORAL EVERY 6 HOURS PRN
Status: DISCONTINUED | OUTPATIENT
Start: 2025-02-26 | End: 2025-02-26 | Stop reason: HOSPADM

## 2025-02-26 RX ORDER — LIDOCAINE 50 MG/G
1 PATCH TOPICAL EVERY 24 HOURS
Qty: 5 PATCH | Refills: 0 | Status: SHIPPED | OUTPATIENT
Start: 2025-02-26 | End: 2025-03-03

## 2025-02-26 RX ORDER — LIDOCAINE 4 G/G
1 PATCH TOPICAL
Status: DISCONTINUED | OUTPATIENT
Start: 2025-02-26 | End: 2025-02-26

## 2025-02-26 RX ADMIN — LIDOCAINE 1 PATCH: 4 PATCH TOPICAL at 20:27

## 2025-02-26 RX ADMIN — TETANUS TOXOID, REDUCED DIPHTHERIA TOXOID AND ACELLULAR PERTUSSIS VACCINE, ADSORBED 0.5 ML: 5; 2.5; 8; 8; 2.5 SUSPENSION INTRAMUSCULAR at 19:39

## 2025-02-26 RX ADMIN — LIDOCAINE 1 PATCH: 4 PATCH TOPICAL at 20:34

## 2025-02-26 RX ADMIN — ACETAMINOPHEN 650 MG: 325 TABLET ORAL at 19:36

## 2025-02-26 NOTE — ED PROVIDER NOTES
Subjective   History of Present Illness  Patient is an 84-year-old female with a mechanical fall.  Says she was going in the laundry room and went to turn to the side and fell.  She does not get dizzy.  Says she did not hit her head.  She landed on her left side and is having pain in the left side of her upper chest and her left knee.  She also has skin tear on her right wrist.  Pain in the actual shoulder joints other than her chronic pain in the right shoulder.  She is not on blood thinners.  She is not having head or neck pain and no loss conscious.      Review of Systems   Constitutional: Negative.    HENT: Negative.     Eyes: Negative.    Respiratory: Negative.     Cardiovascular:  Negative for chest pain.   Gastrointestinal: Negative.    Endocrine: Negative.    Genitourinary: Negative.    Musculoskeletal:         Pain upper left chest, left knee   Skin:  Positive for wound (Right wrist).   Neurological:  Negative for dizziness and syncope.   Psychiatric/Behavioral: Negative.     All other systems reviewed and are negative.      Past Medical History:   Diagnosis Date    Adverse reaction to metoprolol     Nightmares, vivid dreams, and sleep disturbance with metoprolol in the past    CAD (coronary artery disease)     Cath 3/4/10: 30-40% spasm of ostial RCA.  PDA with 30% mid lesion.  Left coronaries normal.    Chest pain     History of recurrent non-cardiac chest pain    Diabetes mellitus     Hyperlipidemia     Hypertension        Allergies   Allergen Reactions    Duloxetine Hcl Other (See Comments)     Hot flashes       Past Surgical History:   Procedure Laterality Date    BILATERAL BREAST REDUCTION      TOTAL ABDOMINAL HYSTERECTOMY WITH SALPINGO OOPHORECTOMY         Family History   Problem Relation Age of Onset    Heart disease Mother         pacemaker    Hypertension Mother     Heart attack Maternal Grandmother     Heart attack Maternal Grandfather     Hypertension Father     Other Sister         small facial  stroke       Social History     Socioeconomic History    Marital status: Single   Tobacco Use    Smoking status: Former    Smokeless tobacco: Never   Substance and Sexual Activity    Alcohol use: Yes     Comment: 1 glass of wine every 2 months    Drug use: No    Sexual activity: Defer           Objective   Physical Exam  Vitals reviewed.   Constitutional:       General: She is not in acute distress.     Appearance: Normal appearance. She is not toxic-appearing.   Eyes:      Conjunctiva/sclera: Conjunctivae normal.   Cardiovascular:      Rate and Rhythm: Normal rate and regular rhythm.      Pulses: Normal pulses.      Heart sounds: Normal heart sounds.   Pulmonary:      Effort: Pulmonary effort is normal.      Breath sounds: Normal breath sounds.   Abdominal:      Tenderness: There is no right CVA tenderness or left CVA tenderness.   Musculoskeletal:      Comments: Bruising and mild swelling anteromedial portion left knee, normal range of motion with pulses and sensation intact.  Skin tear right wrist not tender.  She is tender on the upper left anterior ribs   Skin:     General: Skin is warm and dry.      Comments: Skin tear on ulnar aspect of right wrist, slightly C-shaped and about 5 centimeter diameter   Neurological:      General: No focal deficit present.      Mental Status: She is oriented to person, place, and time.   Psychiatric:         Mood and Affect: Mood normal.         Behavior: Behavior normal.         Wound Care    Date/Time: 2/26/2025 7:57 PM    Performed by: Astrid Hernandez PA-C  Authorized by: Anshu Brand MD    Consent:     Consent obtained:  Verbal    Consent given by:  Patient  Universal protocol:     Patient identity confirmed:  Verbally with patient  Sedation:     Sedation type:  None  Anesthesia:     Anesthesia method:  None  Procedure details:     Wound location:  Hand    Hand location:  R wrist    Wound age (days):  <1    Wound surface area (sq cm):  5  Dressing:     Dressing:  Nonstick.    Wrapped with:  Bulky dressing  Post-procedure details:     Procedure completion:  Tolerated  Comments:      Cleaned wound with surgical scrub and washed with saline.  Pulled skin back over flap and held together with 3 Steri-Strips.  Use a nonstick gauze, Kerlix and Coban.             ED Course                                                       Medical Decision Making  Patient primary complaint is pain in the upper left chest from a fall she took.  Was mechanical and says she was turning around in the laundry room.  Says she did not hit her head or neck but due to age we CAT scan which is NAD.  She knows about the arthritis and issues with her neck.  She is not having pain in her head or her neck.    She has a skin tear on her right wrist about 5 cm.  There is an area that does want to still gape open and I had difficulty getting it closed but she did not want stitches.  I tried it again and was able to close it a little better.  They know to keep it closed for several days and she is with family that are listening to the instructions.  Follow-up with her family doctor concerning this.    X-ray of the wrist to look for fracture negative.  She has had some bruising and mild tenderness on the left knee.  She is not having pain with supple range of motion.  Neurovascular intact.  X-ray unremarkable.  As for the chest I ordered a rib series because of the tenderness in the left upper ribs.  It showed some deformity of the left ribs 4 and 5 of age-indeterminate.  This is about the area where she is having pain and very likely it is acute.  I talked to patient about using an incentive spirometer and pain medication.  Says she will use Tylenol does not want any any other pain medication but she will use lidocaine patch.  Family is of good support and they are going to stay the night with her so she has them with him.  She has an incentive spirometer at home.  She also has pulse ox.  She does understand that  sometimes people are admitted for broken ribs. She wants to go home does not want to be admitted.  With her having good support I feel this is reasonable.  Her pain is much better with Tylenol here and she says it is very mild at this time.      Strict return precautions and patient and family voiced understanding and agree with plan.    Problems Addressed:  Closed fracture of multiple ribs of left side, initial encounter: complicated acute illness or injury    Amount and/or Complexity of Data Reviewed  Radiology: ordered.    Risk  OTC drugs.  Prescription drug management.        Final diagnoses:   Closed fracture of multiple ribs of left side, initial encounter       ED Disposition  ED Disposition       ED Disposition   Discharge    Condition   Stable    Comment   --               Sandra Foley MD  [address]  [address]  [facility] [id number] [phone]    In 3 days           Medication List        New Prescriptions      lidocaine 5 %  Commonly known as: Lidoderm  Place 1 patch on the skin as directed by provider Daily for 5 days. Remove & Discard patch within 12 hours or as directed by MD               Where to Get Your Medications        These medications were sent to [id number] - [place] [address] - [phone]  - [phone]   [address] [place] [address]      Phone: [phone]   lidocaine 5 %            Astrid Hernandez PA-C  02/26/25 2023

## 2025-02-27 NOTE — DISCHARGE INSTRUCTIONS
Remember to use your incentive spirometer and out of the 2 hours when you are awake.  Wear the lidocaine patches for 12 hours and remember to remove it for at least 12 hours before applying a new patch.  You can take Tylenol for pain.  If you develop symptoms of pneumonia such as fevers and coughing or if you have any worse symptoms or if your family has any concerns for your overall wellbeing, return to emergency room to be reevaluated.    As for the wound on your right wrist make sure you are keeping it clean and dry.  You can follow-up with your doctor and have it reevaluated in a few days.  If you have any concerns for infection return to the emergency room.